# Patient Record
Sex: FEMALE | Race: WHITE | Employment: UNEMPLOYED | URBAN - METROPOLITAN AREA
[De-identification: names, ages, dates, MRNs, and addresses within clinical notes are randomized per-mention and may not be internally consistent; named-entity substitution may affect disease eponyms.]

---

## 2019-05-20 DIAGNOSIS — M25.531 RIGHT WRIST PAIN: Primary | ICD-10-CM

## 2019-05-21 ENCOUNTER — OFFICE VISIT (OUTPATIENT)
Dept: ORTHOPEDIC SURGERY | Age: 24
End: 2019-05-21
Payer: COMMERCIAL

## 2019-05-21 VITALS — HEIGHT: 61 IN | BODY MASS INDEX: 43.99 KG/M2 | WEIGHT: 233 LBS

## 2019-05-21 DIAGNOSIS — S52.571A OTHER CLOSED INTRA-ARTICULAR FRACTURE OF DISTAL END OF RIGHT RADIUS, INITIAL ENCOUNTER: Primary | ICD-10-CM

## 2019-05-21 PROCEDURE — 25605 CLTX DST RDL FX/EPHYS SEP W/: CPT | Performed by: ORTHOPAEDIC SURGERY

## 2019-05-21 PROCEDURE — 99204 OFFICE O/P NEW MOD 45 MIN: CPT | Performed by: ORTHOPAEDIC SURGERY

## 2019-05-21 RX ORDER — HYDROCODONE BITARTRATE AND ACETAMINOPHEN 5; 325 MG/1; MG/1
1 TABLET ORAL EVERY 6 HOURS PRN
COMMUNITY
End: 2019-05-24 | Stop reason: ALTCHOICE

## 2019-05-21 RX ORDER — ASCORBIC ACID 500 MG
500 TABLET ORAL DAILY
Qty: 50 TABLET | Refills: 0 | Status: ON HOLD | OUTPATIENT
Start: 2019-05-21 | End: 2021-05-26

## 2019-05-21 RX ORDER — HYDROCODONE BITARTRATE AND ACETAMINOPHEN 5; 325 MG/1; MG/1
1 TABLET ORAL EVERY 6 HOURS PRN
Qty: 28 TABLET | Refills: 0 | Status: SHIPPED | OUTPATIENT
Start: 2019-05-21 | End: 2019-05-28

## 2019-05-21 NOTE — LETTER
[address]  Phone: [phone]  Fax: [phone]    Alan Middleton DO        May 21, 2019     Patient: Pauly Mckeon   YOB: 1995   Date of Visit: 5/21/2019       To Whom It May Concern: It is my medical opinion that Pauly Mckeon should remain out of work until further notice. Patient sustained a wrist injury requiring further advanced imaging, CT. Once images are completed they will be reviewed in my office and further treatment options discussed. If you have any questions or concerns, please don't hesitate to call.     Sincerely,        Alan Middleton DO

## 2019-05-21 NOTE — PROGRESS NOTES
Chief Complaint   Patient presents with    Wrist Pain     Right wrist fracture, Patient fell out of truck. Injury on 5/17/2019         HPI:    Patient is 30 y.o. female complaining of right wrist pain. 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 She was seen at ER out of state and told to follow up with ortho. Previous treatments include splinting. ROS:    Skin: (-) rash,(-) psoriasis,(-) eczema, (-)skin cancer. Neurologic: (-)numbness, (-)tingling, (-)headaches, (-) LOC. Cardiovascular: (-) Chest pain, (-) swelling in legs/feet, (-) SOB, (-) cramping in legs/feet with walking. All other review of systems negative except stated above or in HPI      No past medical history on file. No past surgical history on file. Current Outpatient Medications:     HYDROcodone-acetaminophen (NORCO) 5-325 MG per tablet, Take 1 tablet by mouth every 6 hours as needed for Pain., Disp: , Rfl:     HYDROcodone-acetaminophen (NORCO) 5-325 MG per tablet, Take 1 tablet by mouth every 6 hours as needed for Pain for up to 7 days. , Disp: 28 tablet, Rfl: 0    vitamin C (ASCORBIC ACID) 500 MG tablet, Take 1 tablet by mouth daily, Disp: 50 tablet, Rfl: 0  No Known Allergies  Social History     Socioeconomic History    Marital status: Single     Spouse name: Not on file    Number of children: Not on file    Years of education: Not on file    Highest education level: Not on file   Occupational History    Not on file   Social Needs    Financial resource strain: Not on file    Food insecurity:     Worry: Not on file     Inability: Not on file    Transportation needs:     Medical: Not on file     Non-medical: Not on file   Tobacco Use    Smoking status: Never Smoker   Substance and Sexual Activity    Alcohol use: Not on file    Drug use: Not on file    Sexual activity: Not on file   Lifestyle    Physical activity:     Days per week: Not on file     Minutes per session: Not on file    Stress: Not on file Relationships    Social connections:     Talks on phone: Not on file     Gets together: Not on file     Attends Restorationist service: Not on file     Active member of club or organization: Not on file     Attends meetings of clubs or organizations: Not on file     Relationship status: Not on file    Intimate partner violence:     Fear of current or ex partner: Not on file     Emotionally abused: Not on file     Physically abused: Not on file     Forced sexual activity: Not on file   Other Topics Concern    Not on file   Social History Narrative    Not on file     No family history on file. Physical Exam:    Ht 5' 1\" (1.549 m)   Wt 233 lb (105.7 kg)   BMI 44.02 kg/m²     GENERAL: alert, appears stated age, cooperative, no acute distress    HEENT: Head is normocephalic, atraumatic. PERRLA. SKIN: Clean, dry, intact. There is not any cellulitis or cutaneous lesions noted in the lower extremities    PULMONARY: breathing is regular and unlabored, no acute distress    CV: The bilateral upper and lower extremities are warm and well-perfused with brisk capillary refill. 2+ pulses UE and LE bilateral.     PSYCHIATRY: Pleasant mood, appropriate behavior, follows commands    NEURO: Sensation is intact distally with light touch with no alteration. Motor exam of the lower extremities show quadriceps, hamstrings, foot dorsiflexion and plantarflexion grossly intact 5/5. LYMPH: No lymphedema present distally in upper or lower extrimity. MUSCULOSKELETAL:  Gait: Patient walks around the exam room and in the hallway with a normal gait and no limp. Balance and coordination are age appropriate. Shoulder Exam:  Full range of motion without pain. Elbow Exam:  Full painless range of motion. No effusion. No tenderness to palpation. Wrist/Hand Exam:  Moderate swelling in the Right wrist. Tenderness to palpation over the distal radius. Decreased range of motion secondary to pain.        Imaging:  Xr Wrist Right (min 3 Views)    Result Date: 5/21/2019  LOCATION: 200 EXAM: XR WRIST RIGHT (MIN 3 VIEWS) COMPARISON: None HISTORY: Extremity injury with pain. TECHNIQUE: 3 views of the right wrist were obtained. FINDINGS: Overlying splint obscures of fracture. Distal radius fracture is present with ulnar styloid avulsion. No malalignment seen. Distal radius and ulna fractures. Jeffy Gr was seen today for wrist pain. Diagnoses and all orders for this visit:    Other closed intra-articular fracture of distal end of right radius, initial encounter  -     CT WRIST RIGHT WO CONTRAST; Future  -     HYDROcodone-acetaminophen (NORCO) 5-325 MG per tablet; Take 1 tablet by mouth every 6 hours as needed for Pain for up to 7 days. -     vitamin C (ASCORBIC ACID) 500 MG tablet; Take 1 tablet by mouth daily  -     ND APPLY LONG ARM SPLINT      Patient seen and examined. X-rays reviewed. Patient sustained a nondisplaced intra-articular fracture of the right distal radius. Based off of the x-rays and patient's history, patient may benefit from surgery. However after discussing details in great detail, patient will like to avoid surgery at all possible. A CT scan is also recommended for further evaluation and management of the fracture including the need for surgery as well as preoperative planning. Once again after discussing this, patient is willing to undergo CT scan but would still like to avoid surgery  Patient to remain off work due to injury at this point. Letter distributed today    Return to clinic after CT scan. In a 15 minute assessment and discussion, patient was provided a well molded fiberglass sugar tong splint and applied with manipulation at the fracture site to maintain alignment. She was educated in detail how to maintain splint. Sherri Little DO  5/21/19    45 minutes was spent with patient. 50% or greater was spent counseling the patient.

## 2019-05-24 ENCOUNTER — OFFICE VISIT (OUTPATIENT)
Dept: ORTHOPEDIC SURGERY | Age: 24
End: 2019-05-24
Payer: COMMERCIAL

## 2019-05-24 VITALS — BODY MASS INDEX: 43.99 KG/M2 | HEIGHT: 61 IN | WEIGHT: 233 LBS

## 2019-05-24 DIAGNOSIS — S52.571A OTHER CLOSED INTRA-ARTICULAR FRACTURE OF DISTAL END OF RIGHT RADIUS, INITIAL ENCOUNTER: Primary | ICD-10-CM

## 2019-05-24 PROCEDURE — 99203 OFFICE O/P NEW LOW 30 MIN: CPT | Performed by: ORTHOPAEDIC SURGERY

## 2019-05-24 NOTE — PROGRESS NOTES
Leslie Camacho is a 30 y.o. female, who presents   Chief Complaint   Patient presents with   [facility] New Patient     new patient for right distal end of radius fracture, DOI 5/17/19, pateint fell out of her truck. She had Ct done       HPI[de-identified] Injury occurred 7 days ago in [facility]. Demar Ayon is a  and was stepping down from the truck with laundry and fell hurting her right wrist. She went to emergency room in [facility] and was x-rayed there and told she had a fracture and was splinted. She followed up. She upon return here and was referred for orthopedic care. Imaging was done showing a comminuted fracture of the distal right radius which is intra-articular and also included the ulnar styloid. She also had a CT after this to further define the fracture nature. Allergies; medications; past medical, surgical, family, and social history; and problem list have been reviewed today and updated as indicated in this encounter - see below following Ortho specifics. Musculoskeletal: Her splint is intact and finger neurovascular function is good. Radiologic Studies: Plain films and CT of the right wrist showed a comminuted intra-articular fracture of the right distal radius with slight dorsiflexion angulation but reasonable contour of the joint surface. There is only slight displacement of the ulnar styloid. ASSESSMENT:  Demar Ayon was seen today for new patient. Diagnoses and all orders for this visit:    Other closed intra-articular fracture of distal end of right radius, initial encounter  -     XR WRIST RIGHT (MIN 3 VIEWS); Future     Treatment alternatives were reviewed including medical and physical therapies, injections, and surgical options, expected risks benefits and likely outcome of each were discussed in detail, questions asked and answered and understood. Conservative and surgical treatment options were reviewed and discussed in detail with apparent good understanding.     PLAN: Demar Ayon wants to wait and follow up with repeat x-rays for comparison to see if she can reasonably avoid surgical treatment of this fracture. There is no problem list on file for this patient. History reviewed. No pertinent past medical history. History reviewed. No pertinent surgical history. Current Outpatient Medications   Medication Sig Dispense Refill    HYDROcodone-acetaminophen (NORCO) 5-325 MG per tablet Take 1 tablet by mouth every 6 hours as needed for Pain for up to 7 days. 28 tablet 0    vitamin C (ASCORBIC ACID) 500 MG tablet Take 1 tablet by mouth daily 50 tablet 0     No current facility-administered medications for this visit. No Known Allergies    Social History     Socioeconomic History    Marital status: Single     Spouse name: None    Number of children: None    Years of education: None    Highest education level: None   Occupational History    None   Social Needs    Financial resource strain: None    Food insecurity:     Worry: None     Inability: None    Transportation needs:     Medical: None     Non-medical: None   Tobacco Use    Smoking status: Current Every Day Smoker    Smokeless tobacco: Never Used   Substance and Sexual Activity    Alcohol use: Yes     Comment: occ    Drug use: None    Sexual activity: Yes     Partners: Female   Lifestyle    Physical activity:     Days per week: None     Minutes per session: None    Stress: None   Relationships    Social connections:     Talks on phone: None     Gets together: None     Attends Christian service: None     Active member of club or organization: None     Attends meetings of clubs or organizations: None     Relationship status: None    Intimate partner violence:     Fear of current or ex partner: None     Emotionally abused: None     Physically abused: None     Forced sexual activity: None   Other Topics Concern    None   Social History Narrative    None       History reviewed. No pertinent family history. Review of Systems:   As follows except as previously noted in HPI:  Constitutional: Negative for chills, diaphoresis,  fever   Respiratory: Negative for cough, shortness of breath and wheezing. Cardiovascular: Negative for chest pain and palpitations. Neurological: Negative for dizziness, syncope,   GI / : abdominal pain or cramping  Musculoskeletal: see HPI       Objective:   Physical Exam   Constitutional: Oriented to person, place, and time. and appears well-developed and well-nourished. :   Head: Normocephalic and atraumatic. Neck: Neck supple. Eyes: EOM are normal.   Pulmonary/Chest: Effort normal.  No respiratory distress, no wheezes. Neurological: Alert and oriented to person  Skin: Skin is warm and dry. Kourtney Beaulieu DO    5/24/19  12:12 PM    All reasonable efforts have been made to minimize the risk of errors that may occur in the use of voice recognition and other electronic means of charting.

## 2019-05-29 ENCOUNTER — OFFICE VISIT (OUTPATIENT)
Dept: ORTHOPEDIC SURGERY | Age: 24
End: 2019-05-29

## 2019-05-29 VITALS — HEIGHT: 61 IN | WEIGHT: 233 LBS | BODY MASS INDEX: 43.99 KG/M2

## 2019-05-29 DIAGNOSIS — S52.571A OTHER CLOSED INTRA-ARTICULAR FRACTURE OF DISTAL END OF RIGHT RADIUS, INITIAL ENCOUNTER: Primary | ICD-10-CM

## 2019-05-29 PROCEDURE — 99024 POSTOP FOLLOW-UP VISIT: CPT | Performed by: ORTHOPAEDIC SURGERY

## 2019-05-29 NOTE — PROGRESS NOTES
Chief Complaint:   Chief Complaint   Patient presents with    Follow-up     follow up from wrist fracture, patient had images today. Austine Bumpers  is 12 days post injury to her right distal radius and ulnar styloid. She has a comminuted intra-articular fracture. We'll following up to including x-rays to assess the alignment and position and determine whether she needs surgical treatment not. Allergies; medications; past medical, surgical, family, and social history; and problem list have been reviewed today and updated as indicated in this encounter seen below. Exam: The splint is intact. Fingers move well. She does not seem to be any great distress this time. Radiographs: Imaging 3 views was done and compared to previous films. There has been slight additional settling towards the radial side and the distal radius. There is also been an increase in the dorsiflexion with the dorsal impaction. The ulnar styloid is slightly displaced towards the radial side consistent with the overall injury. This was shown to the patient and her  and discussed in detail with bearing good understanding. ASSESSMENT:    Joe Garza was seen today for follow-up. Diagnoses and all orders for this visit:    Other closed intra-articular fracture of distal end of right radius, initial encounter        PLAN: Open reduction internal plate-screw fixation right distal radius. The surgery, risks, prognosis, limitations and any potential rehab issues were discussed in detail with bearing good understanding and consent. No follow-ups on file. Current Outpatient Medications   Medication Sig Dispense Refill    vitamin C (ASCORBIC ACID) 500 MG tablet Take 1 tablet by mouth daily 50 tablet 0     No current facility-administered medications for this visit. There is no problem list on file for this patient. History reviewed. No pertinent past medical history. History reviewed.  No pertinent normal. No stridor. No respiratory distress, no wheezes. Abdominal:  No abnormal distension. Neurological: Alert and oriented to person, place, and time. Skin: Skin is warm and dry. Psychiatric: Normal mood and affect.  Behavior is normal. Thought content normal.    JESSICA Wills DO    5/29/19  12:06 PM

## 2019-05-30 ENCOUNTER — ANESTHESIA EVENT (OUTPATIENT)
Dept: OPERATING ROOM | Age: 24
End: 2019-05-30
Payer: COMMERCIAL

## 2019-05-30 RX ORDER — HYDROCODONE BITARTRATE AND ACETAMINOPHEN 5; 325 MG/1; MG/1
1 TABLET ORAL EVERY 6 HOURS PRN
Status: ON HOLD | COMMUNITY
End: 2019-05-31 | Stop reason: HOSPADM

## 2019-05-30 RX ORDER — IBUPROFEN 200 MG
600 TABLET ORAL PRN
Status: ON HOLD | COMMUNITY
End: 2021-05-26

## 2019-05-31 ENCOUNTER — ANESTHESIA (OUTPATIENT)
Dept: OPERATING ROOM | Age: 24
End: 2019-05-31
Payer: COMMERCIAL

## 2019-05-31 ENCOUNTER — HOSPITAL ENCOUNTER (OUTPATIENT)
Dept: GENERAL RADIOLOGY | Age: 24
Discharge: HOME | End: 2019-06-02
Payer: COMMERCIAL

## 2019-05-31 ENCOUNTER — HOSPITAL ENCOUNTER (OUTPATIENT)
Age: 24
Setting detail: OUTPATIENT SURGERY
Discharge: HOME | End: 2019-05-31
Attending: ORTHOPAEDIC SURGERY | Admitting: ORTHOPAEDIC SURGERY
Payer: COMMERCIAL

## 2019-05-31 VITALS
DIASTOLIC BLOOD PRESSURE: 77 MMHG | SYSTOLIC BLOOD PRESSURE: 114 MMHG | OXYGEN SATURATION: 99 % | RESPIRATION RATE: 15 BRPM

## 2019-05-31 VITALS
BODY MASS INDEX: 44.93 KG/M2 | DIASTOLIC BLOOD PRESSURE: 70 MMHG | SYSTOLIC BLOOD PRESSURE: 108 MMHG | TEMPERATURE: 97.2 F | WEIGHT: 238 LBS | HEART RATE: 68 BPM | RESPIRATION RATE: 14 BRPM | OXYGEN SATURATION: 98 % | HEIGHT: 61 IN

## 2019-05-31 DIAGNOSIS — S52.611A CLOSED DISPLACED FRACTURE OF STYLOID PROCESS OF RIGHT ULNA, INITIAL ENCOUNTER: ICD-10-CM

## 2019-05-31 DIAGNOSIS — S62.101A CLOSED FRACTURE OF RIGHT WRIST, INITIAL ENCOUNTER: ICD-10-CM

## 2019-05-31 DIAGNOSIS — S52.501A CLOSED FRACTURE OF DISTAL END OF RIGHT RADIUS, UNSPECIFIED FRACTURE MORPHOLOGY, INITIAL ENCOUNTER: Primary | ICD-10-CM

## 2019-05-31 LAB — HCG(URINE) PREGNANCY TEST: NEGATIVE

## 2019-05-31 PROCEDURE — 3700000001 HC ADD 15 MINUTES (ANESTHESIA): Performed by: ORTHOPAEDIC SURGERY

## 2019-05-31 PROCEDURE — 3209999900 FLUORO FOR SURGICAL PROCEDURES

## 2019-05-31 PROCEDURE — 3700000000 HC ANESTHESIA ATTENDED CARE: Performed by: ORTHOPAEDIC SURGERY

## 2019-05-31 PROCEDURE — 6360000002 HC RX W HCPCS: Performed by: NURSE ANESTHETIST, CERTIFIED REGISTERED

## 2019-05-31 PROCEDURE — 7100000000 HC PACU RECOVERY - FIRST 15 MIN: Performed by: ORTHOPAEDIC SURGERY

## 2019-05-31 PROCEDURE — 7100000001 HC PACU RECOVERY - ADDTL 15 MIN: Performed by: ORTHOPAEDIC SURGERY

## 2019-05-31 PROCEDURE — C1713 ANCHOR/SCREW BN/BN,TIS/BN: HCPCS | Performed by: ORTHOPAEDIC SURGERY

## 2019-05-31 PROCEDURE — 2709999900 HC NON-CHARGEABLE SUPPLY: Performed by: ORTHOPAEDIC SURGERY

## 2019-05-31 PROCEDURE — 2580000003 HC RX 258: Performed by: ANESTHESIOLOGY

## 2019-05-31 PROCEDURE — 7100000011 HC PHASE II RECOVERY - ADDTL 15 MIN: Performed by: ORTHOPAEDIC SURGERY

## 2019-05-31 PROCEDURE — 6360000002 HC RX W HCPCS: Performed by: ANESTHESIOLOGY

## 2019-05-31 PROCEDURE — 3600000003 HC SURGERY LEVEL 3 BASE: Performed by: ORTHOPAEDIC SURGERY

## 2019-05-31 PROCEDURE — 81025 URINE PREGNANCY TEST: CPT

## 2019-05-31 PROCEDURE — 2720000010 HC SURG SUPPLY STERILE: Performed by: ORTHOPAEDIC SURGERY

## 2019-05-31 PROCEDURE — 6360000002 HC RX W HCPCS

## 2019-05-31 PROCEDURE — 64415 NJX AA&/STRD BRCH PLXS IMG: CPT | Performed by: ANESTHESIOLOGY

## 2019-05-31 PROCEDURE — 2580000003 HC RX 258: Performed by: NURSE ANESTHETIST, CERTIFIED REGISTERED

## 2019-05-31 PROCEDURE — 3600000013 HC SURGERY LEVEL 3 ADDTL 15MIN: Performed by: ORTHOPAEDIC SURGERY

## 2019-05-31 PROCEDURE — 7100000010 HC PHASE II RECOVERY - FIRST 15 MIN: Performed by: ORTHOPAEDIC SURGERY

## 2019-05-31 DEVICE — SCREW BNE L18MM DIA2.4MM DST RAD VOLAR S STL ST VAR ANG LOK: Type: IMPLANTABLE DEVICE | Site: WRIST | Status: FUNCTIONAL

## 2019-05-31 DEVICE — SCREW BNE L14MM DIA2.7MM CORT S STL ST T8 STARDRV RECESS: Type: IMPLANTABLE DEVICE | Site: WRIST | Status: FUNCTIONAL

## 2019-05-31 DEVICE — SCREW BNE L12MM DIA2.7MM CORT S STL ST T8 STARDRV RECESS: Type: IMPLANTABLE DEVICE | Site: WRIST | Status: FUNCTIONAL

## 2019-05-31 DEVICE — PLATE BNE W22XL54MM STD 6X3 H ST R DST RAD VOLAR S STL VAR: Type: IMPLANTABLE DEVICE | Site: WRIST | Status: FUNCTIONAL

## 2019-05-31 DEVICE — SCREW BNE L16MM DIA2.4MM DST RAD VOLAR S STL ST VAR ANG LOK: Type: IMPLANTABLE DEVICE | Site: WRIST | Status: FUNCTIONAL

## 2019-05-31 RX ORDER — MIDAZOLAM HYDROCHLORIDE 1 MG/ML
INJECTION INTRAMUSCULAR; INTRAVENOUS
Status: DISCONTINUED
Start: 2019-05-31 | End: 2019-05-31 | Stop reason: HOSPADM

## 2019-05-31 RX ORDER — ROPIVACAINE HYDROCHLORIDE 5 MG/ML
INJECTION, SOLUTION EPIDURAL; INFILTRATION; PERINEURAL
Status: COMPLETED | OUTPATIENT
Start: 2019-05-31 | End: 2019-05-31

## 2019-05-31 RX ORDER — SODIUM CHLORIDE 0.9 % (FLUSH) 0.9 %
10 SYRINGE (ML) INJECTION PRN
Status: DISCONTINUED | OUTPATIENT
Start: 2019-05-31 | End: 2019-05-31 | Stop reason: HOSPADM

## 2019-05-31 RX ORDER — FENTANYL CITRATE 50 UG/ML
INJECTION, SOLUTION INTRAMUSCULAR; INTRAVENOUS
Status: DISCONTINUED
Start: 2019-05-31 | End: 2019-05-31 | Stop reason: HOSPADM

## 2019-05-31 RX ORDER — MEPERIDINE HYDROCHLORIDE 25 MG/ML
INJECTION INTRAMUSCULAR; INTRAVENOUS; SUBCUTANEOUS
Status: COMPLETED
Start: 2019-05-31 | End: 2019-05-31

## 2019-05-31 RX ORDER — FENTANYL CITRATE 50 UG/ML
50 INJECTION, SOLUTION INTRAMUSCULAR; INTRAVENOUS
Status: COMPLETED | OUTPATIENT
Start: 2019-05-31 | End: 2019-05-31

## 2019-05-31 RX ORDER — PROPOFOL 10 MG/ML
INJECTION, EMULSION INTRAVENOUS PRN
Status: DISCONTINUED | OUTPATIENT
Start: 2019-05-31 | End: 2019-05-31 | Stop reason: SDUPTHER

## 2019-05-31 RX ORDER — SODIUM CHLORIDE, SODIUM LACTATE, POTASSIUM CHLORIDE, CALCIUM CHLORIDE 600; 310; 30; 20 MG/100ML; MG/100ML; MG/100ML; MG/100ML
INJECTION, SOLUTION INTRAVENOUS CONTINUOUS PRN
Status: DISCONTINUED | OUTPATIENT
Start: 2019-05-31 | End: 2019-05-31 | Stop reason: SDUPTHER

## 2019-05-31 RX ORDER — ONDANSETRON 2 MG/ML
INJECTION INTRAMUSCULAR; INTRAVENOUS PRN
Status: DISCONTINUED | OUTPATIENT
Start: 2019-05-31 | End: 2019-05-31 | Stop reason: SDUPTHER

## 2019-05-31 RX ORDER — DEXAMETHASONE SODIUM PHOSPHATE 10 MG/ML
INJECTION, SOLUTION INTRAMUSCULAR; INTRAVENOUS PRN
Status: DISCONTINUED | OUTPATIENT
Start: 2019-05-31 | End: 2019-05-31 | Stop reason: SDUPTHER

## 2019-05-31 RX ORDER — ROPIVACAINE HYDROCHLORIDE 5 MG/ML
INJECTION, SOLUTION EPIDURAL; INFILTRATION; PERINEURAL
Status: COMPLETED
Start: 2019-05-31 | End: 2019-05-31

## 2019-05-31 RX ORDER — FENTANYL CITRATE 50 UG/ML
INJECTION, SOLUTION INTRAMUSCULAR; INTRAVENOUS PRN
Status: DISCONTINUED | OUTPATIENT
Start: 2019-05-31 | End: 2019-05-31 | Stop reason: SDUPTHER

## 2019-05-31 RX ORDER — MIDAZOLAM HYDROCHLORIDE 1 MG/ML
1 INJECTION INTRAMUSCULAR; INTRAVENOUS PRN
Status: CANCELLED | OUTPATIENT
Start: 2019-05-31

## 2019-05-31 RX ORDER — SODIUM CHLORIDE, SODIUM LACTATE, POTASSIUM CHLORIDE, CALCIUM CHLORIDE 600; 310; 30; 20 MG/100ML; MG/100ML; MG/100ML; MG/100ML
INJECTION, SOLUTION INTRAVENOUS CONTINUOUS
Status: DISCONTINUED | OUTPATIENT
Start: 2019-05-31 | End: 2019-05-31 | Stop reason: HOSPADM

## 2019-05-31 RX ORDER — ROPIVACAINE HYDROCHLORIDE 5 MG/ML
40 INJECTION, SOLUTION EPIDURAL; INFILTRATION; PERINEURAL
Status: CANCELLED | OUTPATIENT
Start: 2019-05-31 | End: 2019-05-31

## 2019-05-31 RX ORDER — PROPOFOL 10 MG/ML
INJECTION, EMULSION INTRAVENOUS CONTINUOUS PRN
Status: DISCONTINUED | OUTPATIENT
Start: 2019-05-31 | End: 2019-05-31 | Stop reason: SDUPTHER

## 2019-05-31 RX ORDER — ROPIVACAINE HYDROCHLORIDE 5 MG/ML
30 INJECTION, SOLUTION EPIDURAL; INFILTRATION; PERINEURAL ONCE
Status: COMPLETED | OUTPATIENT
Start: 2019-05-31 | End: 2019-05-31

## 2019-05-31 RX ORDER — MEPERIDINE HYDROCHLORIDE 25 MG/ML
12.5 INJECTION INTRAMUSCULAR; INTRAVENOUS; SUBCUTANEOUS EVERY 5 MIN PRN
Status: DISCONTINUED | OUTPATIENT
Start: 2019-05-31 | End: 2019-05-31 | Stop reason: HOSPADM

## 2019-05-31 RX ORDER — MIDAZOLAM HYDROCHLORIDE 1 MG/ML
2 INJECTION INTRAMUSCULAR; INTRAVENOUS
Status: COMPLETED | OUTPATIENT
Start: 2019-05-31 | End: 2019-05-31

## 2019-05-31 RX ORDER — HYDROMORPHONE HYDROCHLORIDE 1 MG/ML
0.25 INJECTION, SOLUTION INTRAMUSCULAR; INTRAVENOUS; SUBCUTANEOUS EVERY 5 MIN PRN
Status: DISCONTINUED | OUTPATIENT
Start: 2019-05-31 | End: 2019-05-31 | Stop reason: HOSPADM

## 2019-05-31 RX ORDER — NALOXONE HYDROCHLORIDE 0.4 MG/ML
0.4 INJECTION, SOLUTION INTRAMUSCULAR; INTRAVENOUS; SUBCUTANEOUS PRN
Status: CANCELLED | OUTPATIENT
Start: 2019-05-31

## 2019-05-31 RX ORDER — HYDROCODONE BITARTRATE AND ACETAMINOPHEN 5; 325 MG/1; MG/1
1 TABLET ORAL EVERY 6 HOURS PRN
Qty: 30 TABLET | Refills: 0 | Status: SHIPPED | OUTPATIENT
Start: 2019-05-31 | End: 2019-06-08

## 2019-05-31 RX ORDER — FENTANYL CITRATE 50 UG/ML
50 INJECTION, SOLUTION INTRAMUSCULAR; INTRAVENOUS EVERY 10 MIN PRN
Status: CANCELLED | OUTPATIENT
Start: 2019-05-31

## 2019-05-31 RX ADMIN — PROPOFOL 50 MG: 10 INJECTION, EMULSION INTRAVENOUS at 15:27

## 2019-05-31 RX ADMIN — ONDANSETRON HYDROCHLORIDE 4 MG: 2 INJECTION, SOLUTION INTRAMUSCULAR; INTRAVENOUS at 14:55

## 2019-05-31 RX ADMIN — ROPIVACAINE HYDROCHLORIDE 40 ML: 5 INJECTION, SOLUTION EPIDURAL; INFILTRATION; PERINEURAL at 13:54

## 2019-05-31 RX ADMIN — PROPOFOL 125 MCG/KG/MIN: 10 INJECTION, EMULSION INTRAVENOUS at 14:52

## 2019-05-31 RX ADMIN — PROPOFOL 50 MG: 10 INJECTION, EMULSION INTRAVENOUS at 14:55

## 2019-05-31 RX ADMIN — FENTANYL CITRATE 50 MCG: 50 INJECTION INTRAMUSCULAR; INTRAVENOUS at 13:45

## 2019-05-31 RX ADMIN — PHENYLEPHRINE HYDROCHLORIDE 100 MCG: 10 INJECTION INTRAVENOUS at 15:32

## 2019-05-31 RX ADMIN — DEXAMETHASONE SODIUM PHOSPHATE 10 MG: 10 INJECTION, SOLUTION INTRAMUSCULAR; INTRAVENOUS at 14:55

## 2019-05-31 RX ADMIN — MIDAZOLAM 2 MG: 1 INJECTION INTRAMUSCULAR; INTRAVENOUS at 13:45

## 2019-05-31 RX ADMIN — FENTANYL CITRATE 50 MCG: 50 INJECTION INTRAMUSCULAR; INTRAVENOUS at 13:42

## 2019-05-31 RX ADMIN — SODIUM CHLORIDE, POTASSIUM CHLORIDE, SODIUM LACTATE AND CALCIUM CHLORIDE: 600; 310; 30; 20 INJECTION, SOLUTION INTRAVENOUS at 14:45

## 2019-05-31 RX ADMIN — MIDAZOLAM 2 MG: 1 INJECTION INTRAMUSCULAR; INTRAVENOUS at 13:42

## 2019-05-31 RX ADMIN — MEPERIDINE HYDROCHLORIDE 12.5 MG: 25 INJECTION INTRAMUSCULAR; INTRAVENOUS; SUBCUTANEOUS at 17:30

## 2019-05-31 RX ADMIN — HYDROMORPHONE HYDROCHLORIDE 0.25 MG: 1 INJECTION, SOLUTION INTRAMUSCULAR; INTRAVENOUS; SUBCUTANEOUS at 17:21

## 2019-05-31 RX ADMIN — MEPERIDINE HYDROCHLORIDE 12.5 MG: 25 INJECTION INTRAMUSCULAR; INTRAVENOUS; SUBCUTANEOUS at 17:42

## 2019-05-31 RX ADMIN — SODIUM CHLORIDE, POTASSIUM CHLORIDE, SODIUM LACTATE AND CALCIUM CHLORIDE: 600; 310; 30; 20 INJECTION, SOLUTION INTRAVENOUS at 12:40

## 2019-05-31 RX ADMIN — FENTANYL CITRATE 100 MCG: 50 INJECTION, SOLUTION INTRAMUSCULAR; INTRAVENOUS at 15:28

## 2019-05-31 RX ADMIN — PROPOFOL 100 MG: 10 INJECTION, EMULSION INTRAVENOUS at 14:52

## 2019-05-31 RX ADMIN — ROPIVACAINE HYDROCHLORIDE 38 ML: 5 INJECTION, SOLUTION EPIDURAL; INFILTRATION; PERINEURAL at 13:45

## 2019-05-31 ASSESSMENT — PULMONARY FUNCTION TESTS
PIF_VALUE: 0
PIF_VALUE: 1
PIF_VALUE: 0
PIF_VALUE: 1
PIF_VALUE: 0
PIF_VALUE: 1
PIF_VALUE: 0
PIF_VALUE: 1
PIF_VALUE: 0
PIF_VALUE: 1

## 2019-05-31 ASSESSMENT — PAIN DESCRIPTION - LOCATION
LOCATION: WRIST

## 2019-05-31 ASSESSMENT — PAIN DESCRIPTION - ORIENTATION
ORIENTATION: RIGHT

## 2019-05-31 ASSESSMENT — PAIN SCALES - GENERAL
PAINLEVEL_OUTOF10: 5
PAINLEVEL_OUTOF10: 5
PAINLEVEL_OUTOF10: 2
PAINLEVEL_OUTOF10: 2
PAINLEVEL_OUTOF10: 5
PAINLEVEL_OUTOF10: 4
PAINLEVEL_OUTOF10: 5
PAINLEVEL_OUTOF10: 3

## 2019-05-31 ASSESSMENT — PAIN DESCRIPTION - PROGRESSION
CLINICAL_PROGRESSION: GRADUALLY WORSENING
CLINICAL_PROGRESSION: GRADUALLY WORSENING
CLINICAL_PROGRESSION: GRADUALLY IMPROVING
CLINICAL_PROGRESSION: GRADUALLY IMPROVING
CLINICAL_PROGRESSION: NOT CHANGED
CLINICAL_PROGRESSION: GRADUALLY WORSENING
CLINICAL_PROGRESSION: GRADUALLY IMPROVING

## 2019-05-31 ASSESSMENT — PAIN DESCRIPTION - DESCRIPTORS
DESCRIPTORS: BURNING
DESCRIPTORS: BURNING
DESCRIPTORS: BURNING;JABBING
DESCRIPTORS: BURNING
DESCRIPTORS: BURNING;JABBING

## 2019-05-31 ASSESSMENT — PAIN DESCRIPTION - ONSET
ONSET: GRADUAL

## 2019-05-31 ASSESSMENT — PAIN DESCRIPTION - FREQUENCY
FREQUENCY: CONTINUOUS
FREQUENCY: INTERMITTENT

## 2019-05-31 ASSESSMENT — PAIN - FUNCTIONAL ASSESSMENT
PAIN_FUNCTIONAL_ASSESSMENT: 0-10
PAIN_FUNCTIONAL_ASSESSMENT: PREVENTS OR INTERFERES SOME ACTIVE ACTIVITIES AND ADLS

## 2019-05-31 ASSESSMENT — PAIN DESCRIPTION - PAIN TYPE
TYPE: SURGICAL PAIN

## 2019-05-31 ASSESSMENT — LIFESTYLE VARIABLES: SMOKING_STATUS: 1

## 2019-05-31 NOTE — PROGRESS NOTES
@ 1344 procedure began, additional versed and fentanyl was needed. Tyrone tolerated the procedure well without any complications     @ 1863 family brought to bedside to visit after Clavicular Block was Completed.
any makeup (including no eye makeup) or nail polish on your fingers or toes. 11. DO NOT wear any jewelry or piercings on day of surgery. All body piercing jewelry must be removed. 12. Shower the night before surgery with _x_Antibacterial soap /LOTTIE WIPES________    13. TOTAL JOINT REPLACEMENT/HYSTERECTOMY PATIENTS ONLY---Remember to bring Blood Bank bracelet to the hospital on the day of surgery. 14. If you have a Living Will and Durable Power of  for Healthcare, please bring in a copy. 15. If appropriate bring crutches, inspirex, WALKER, CANE etc... 12. Notify your Surgeon if you develop any illness between now and surgery time, cough, cold, fever, sore throat, nausea, vomiting, etc.  Please notify your surgeon if you experience dizziness, shortness of breath or blurred vision between now & the time of your surgery. 17. If you have ___dentures, they will be removed before going to the OR; we will provide you a container. If you wear ___contact lenses or ___glasses, they will be removed; please bring a case for them. 18. To provide excellent care visitors will be limited to 2 in the room at any given time. 19. Please bring picture ID and insurance card. 20. Sleep apnea patients need to bring CPAP AND SETTINGS to hospital on day of surgery. 21. During flu season no children under the age of 15 are permitted in the hospital for the safety of all patients. 22. Other                  Please call AMBULATORY CARE if you have any further questions.    [address] [facility]     [address]

## 2019-05-31 NOTE — ANESTHESIA PRE PROCEDURE
kg)   Height: 5' 1\" (1.549 m)                                              BP Readings from Last 3 Encounters:   No data found for BP       NPO Status:                                                                                 BMI:   Wt Readings from Last 3 Encounters:   05/29/19 233 lb (105.7 kg)   05/24/19 233 lb (105.7 kg)   05/21/19 233 lb (105.7 kg)     Body mass index is 44.02 kg/m². CBC: No results found for: WBC, RBC, HGB, HCT, MCV, RDW, PLT    CMP: No results found for: NA, K, CL, CO2, BUN, CREATININE, GFRAA, AGRATIO, LABGLOM, GLUCOSE, PROT, CALCIUM, BILITOT, ALKPHOS, AST, ALT    POC Tests: No results for input(s): POCGLU, POCNA, POCK, POCCL, POCBUN, POCHEMO, POCHCT in the last 72 hours. Coags: No results found for: PROTIME, INR, APTT    HCG (If Applicable): No results found for: PREGTESTUR, PREGSERUM, HCG, HCGQUANT     ABGs: No results found for: PHART, PO2ART, XUJ4FIU, TCN5ZSU, BEART, C7BSZGLQ     Type & Screen (If Applicable):  No results found for: LABABO, [address]    Anesthesia Evaluation  Patient summary reviewed  Airway:         Dental:          Pulmonary:   (+) current smoker                           Cardiovascular:Negative CV ROS                      Neuro/Psych:   Negative Neuro/Psych ROS              GI/Hepatic/Renal: Neg GI/Hepatic/Renal ROS            Endo/Other: Negative Endo/Other ROS                    Abdominal:           Vascular: negative vascular ROS. Anesthesia Plan      general     ASA 2     (Single shot pre operative right infra clavicular brachial plexus nerve block.)  Induction: intravenous. MIPS: Postoperative opioids intended. Anesthetic plan and risks discussed with patient. Plan discussed with CRNA.     Attending anesthesiologist reviewed and agrees with Tomasa Ojeda MD   5/31/2019

## 2019-05-31 NOTE — OP NOTE
Operative report        DATE OF PROCEDURE: 2019     SURGEON: Huy Medina    ASSISTANT: Mine García    PREOPERATIVE DIAGNOSIS: comminuted intra-articular fracture of the right distal radius fracture the ulnar styloid    POSTOPERATIVE DIAGNOSIS: same    OPERATION: open reduction internal plate-screw fixation right distal radius    ANESTHESIA: iinterscalene block with IV sedation    ESTIMATED BLOOD LOSS:  Less than 50 mL    COMPLICATIONS: None    SPECIMENS: Was sent to pathology    HISTORY: The patient is a 30y.o. year old female with history of above preop diagnosis. I explained the risk, benefits, expected outcome, and alternatives to the procedure. Patient understands and is in agreement. PROCEDURE:  The patient brought the operating room after site and side were identified. Adequate regional block was compromised by anesthesia department. The patient was placed supine on the table. Tourniquet was placed high on the right upper arm. The arm was then prepped and draped sterile fashion. The C-arm was brought into position to help determine the nature and deformity of the fracture line. This was also used to plan the surgical incision. tthe right upper extremity was exsanguinated with elastic wrap tourniquet pressure was set at 250 mmHg pressure. Incision was made along the flexor carpi radialis tendon. This was deepened down through subcu tissue and the tendon sheath was opened up. The tendon was taken to the ulnar side and soft tissues were divided along the more radial side protect the neural and vascular structures as well as tendons. The median nerve is retracted the ulnar side protected. The quadratus muscle was elevated from the distal radius. This made fracture lines evident particularly the longitudinal fracture adjacent to the radial styloid.  This was reduced with a clamp and then a K wire was placed obliquely from the styloid proximal and ulnar to cross the fracture line help hold this in irrigated and closed with intradermal 3-0 Vicryl sutures  And Steri-Strips further secured the skin. Xeroform gauze and a bulky dressing was applied and the wrist was splinted in neutral position in a short arm fashion. Tourniquet was deflated and good circumflex ablation turned to the upper extremity patient was taken to recovery in stable condition. GROSS PATHOLOGY: comminuted intra-articular fracture of the right distal radius with dorsal angulation and slight shortening. Fracture of the right ulnar styloid with some radial displacement. COMPONENTS USED :  Since these volar distal radius plate standard 3 hole with multiple distal locking screws and 3 proximal cortical screws. All reasonable efforts have been made to minimize the risk of errors that may occur in the use of voice recognition and other electronic means of charting.       Electronically signed by Kourtney Beaulieu DO on 5/31/19 at 5:35 PM

## 2019-05-31 NOTE — H&P
History and Physical      CHIEF COMPLAINT:  R wrist inj    HISTORY OF PRESENT ILLNESS:          Past Medical History:    History reviewed. No pertinent past medical history. Past Surgical History:    History reviewed. No pertinent surgical history. Social History:    TOBACCO:   reports that she has been smoking cigars. She has never used smokeless tobacco.  ETOH:   reports that she drinks alcohol. DRUGS:   reports that she does not use drugs. Family History:   History reviewed. No pertinent family history. Medications Prior to Admission:  Medications Prior to Admission: HYDROcodone-acetaminophen (NORCO) 5-325 MG per tablet, Take 1 tablet by mouth every 6 hours as needed for Pain. ibuprofen (ADVIL;MOTRIN) 200 MG tablet, Take 600 mg by mouth as needed for Pain  vitamin C (ASCORBIC ACID) 500 MG tablet, Take 1 tablet by mouth daily  Allergies:  Patient has no known allergies. CONSTITUTIONAL:  negative for  chills and anorexia  HEENT:  negative for  tinnitus  RESPIRATORY:  negative for  dyspnea and cyanosis  CARDIOVASCULAR:  negative for  palpitations, PND  GASTROINTESTINAL:  negative for vomiting and hematemesis  GENITOURINARY:  negative for hematuria  ENDOCRINE:  negative for tremor  MUSCULOSKELETAL:  positive for  pain and decreased range of motion  NEUROLOGICAL:  negative for seizures and syncope  BEHAVIOR/PSYCH:  negative for agitated and anxiety    PHYSICAL EXAM:  BP (!) 129/90   Pulse 74   Temp 98 °F (36.7 °C) (Temporal)   Resp 16   Ht 5' 1\" (1.549 m)   Wt 238 lb (108 kg)   LMP 05/27/2019   SpO2 97%   BMI 44.97 kg/m²   General appearance:  awake, alert, cooperative, no apparent distress, and appears stated age  Neurologic:  Awake, alert, oriented to name, place and time. Cranial nerves II-XII are grossly intact. Motor is 5 out of 5 bilaterally. Cerebellar finger to nose, heel to shin intact. Sensory is intact.   Babinski down going, Romberg negative, and gait is normal.  Lungs:  No increased work of breathing, good air exchange, clear to auscultation bilaterally, no crackles or wheezing  Heart:  Normal apical impulse, regular rate and rhythm, normal S1 and S2, no S3 or S4, and no murmur noted  Abdomen:  No scars, normal bowel sounds, soft, non-distended, non-tender, no masses palpated, no hepatosplenomegally  Skin: warm and dry, no rash or erythema  ENT: tympanic membrane, external ear and ear canal normal bilaterally, oropharynx clear and moist with normal mucous membranes  Musculoskeletal: plint RUE    General Labs:  CBC: No results found for: WBC, RBC, HGB, HCT, MCV, RDW, PLT  CMP:  No results found for: NA, K, CL, CO2, BUN, PROT, ALB  U/A:  No components found for: Marylene Shorten, USPGRAV, UPH, Lalla Sly, UKETONE, UBILI, UBLOOD, UNITRITE, UUROBIL, [place], USQEPI, [place], UWBC, [place]    Radiology: frac R rad    ASSESSMENT AND PLAN:    ORIF      Electronically signed by Annette Vargas DO on 5/31/2019 at 1:15 PM

## 2019-06-01 NOTE — ANESTHESIA POSTPROCEDURE EVALUATION
Department of Anesthesiology  Postprocedure Note    Patient: Kell Hylton  MRN: [id number]  YOB: 1995  Date of evaluation: 5/31/2019  Time:  8:40 PM     Procedure Summary     Date:  05/31/19 Room / Location:  [address] [place] [address] 06 / [address] [facility] [address]    Anesthesia Start:  1445 Anesthesia Stop:  8972    Procedure:  RIGHT WRIST OPEN REDUCTION INTERNAL FIXATION (Right ) Diagnosis:  (INTRA ARTICULAR FRACTURE OF RIGHT DISTAL RADIUS)    Surgeon:  Alison Hawkins DO Responsible Provider:  Marylou Stovall MD    Anesthesia Type:  general ASA Status:  2          Anesthesia Type: general    Leighton Phase I: Leighton Score: 10    Leighton Phase II: Leighton Score: 10    Last vitals: Reviewed and per EMR flowsheets.        Anesthesia Post Evaluation    Patient location during evaluation: PACU  Patient participation: complete - patient participated  Level of consciousness: awake  Pain score: 0  Airway patency: patent  Nausea & Vomiting: no nausea  Complications: no  Cardiovascular status: blood pressure returned to baseline  Respiratory status: acceptable  Hydration status: euvolemic

## 2019-06-10 ENCOUNTER — OFFICE VISIT (OUTPATIENT)
Dept: ORTHOPEDIC SURGERY | Age: 24
End: 2019-06-10

## 2019-06-10 VITALS — WEIGHT: 233 LBS | HEIGHT: 61 IN | BODY MASS INDEX: 43.99 KG/M2

## 2019-06-10 DIAGNOSIS — S52.571A OTHER CLOSED INTRA-ARTICULAR FRACTURE OF DISTAL END OF RIGHT RADIUS, INITIAL ENCOUNTER: Primary | ICD-10-CM

## 2019-06-10 PROCEDURE — 99024 POSTOP FOLLOW-UP VISIT: CPT | Performed by: ORTHOPAEDIC SURGERY

## 2019-06-10 NOTE — PROGRESS NOTES
Chief Complaint:   Chief Complaint   Patient presents with    Post-Op Check     post-op from right wrist ORIF DOS 5/31/19, patient states she is doing better       [employer]  is 10 days post ORIF right distal radius. She's doing fairly well but has some soreness and guards her hand wrist and forearm quite a bit. There is no complaint of numbness or tingling. She's little itchy as expected. Allergies; medications; past medical, surgical, family, and social history; and problem list have been reviewed today and updated as indicated in this encounter seen below. Exam: Wound edges are well approximated. There is been a little bit of bleeding was from the surgery but all looks well now. She has no signs of any complication. There is no present drainage wound being dry. There is mild edema only. Fingers are little bit guarded and she doesn't move them whole lot she doesn't like forearm rotation at all. Her elbow moves fine. ASSESSMENT:    Swathi Shearer was seen today for post-op check. Diagnoses and all orders for this visit:    Other closed intra-articular fracture of distal end of right radius, initial encounter        PLAN: Swathi Shearer was placed in a forearm fracture brace which she may remove to care for the wound including the daily or QO day dressing changes. She can also begin to set this in 1 or 2 days. Finger range of motion was encouraged as well as very gentle of wrist and forearm range of motion. We'll follow in 3 weeks. Return in about 3 weeks (around 7/1/2019). Current Outpatient Medications   Medication Sig Dispense Refill    ibuprofen (ADVIL;MOTRIN) 200 MG tablet Take 600 mg by mouth as needed for Pain      vitamin C (ASCORBIC ACID) 500 MG tablet Take 1 tablet by mouth daily 50 tablet 0     No current facility-administered medications for this visit.         Patient Active Problem List   Diagnosis    Closed fracture of right distal radius    Closed displaced fracture of styloid process of right ulna       History reviewed. No pertinent past medical history. Past Surgical History:   Procedure Laterality Date    WRIST FRACTURE SURGERY Right 5/31/2019    RIGHT WRIST OPEN REDUCTION INTERNAL FIXATION performed by Sweta Gomez DO at [address]       No Known Allergies    Social History     Socioeconomic History    Marital status: Single     Spouse name: None    Number of children: None    Years of education: None    Highest education level: None   Occupational History    None   Social Needs    Financial resource strain: None    Food insecurity:     Worry: None     Inability: None    Transportation needs:     Medical: None     Non-medical: None   Tobacco Use    Smoking status: Current Every Day Smoker     Types: Cigars    Smokeless tobacco: Never Used    Tobacco comment: a cigar daily   Substance and Sexual Activity    Alcohol use: Yes     Comment: occ    Drug use: Never    Sexual activity: Yes     Partners: Female   Lifestyle    Physical activity:     Days per week: None     Minutes per session: None    Stress: None   Relationships    Social connections:     Talks on phone: None     Gets together: None     Attends [employer]ism service: None     Active member of club or organization: None     Attends meetings of clubs or organizations: None     Relationship status: None    Intimate partner violence:     Fear of current or ex partner: None     Emotionally abused: None     Physically abused: None     Forced sexual activity: None   Other Topics Concern    None   Social History Narrative    None       Review of Systems  As follows except as previously noted in HPI:  Constitutional: Negative for chills, diaphoresis, fatigue, fever and unexpected weight change. Respiratory: Negative for cough, shortness of breath and wheezing. Cardiovascular: Negative for chest pain and palpitations. Neurological: Negative for dizziness, syncope, cephalgia.   GI / :

## 2019-07-01 ENCOUNTER — OFFICE VISIT (OUTPATIENT)
Dept: ORTHOPEDIC SURGERY | Age: 24
End: 2019-07-01
Payer: COMMERCIAL

## 2019-07-01 VITALS — HEIGHT: 61 IN | WEIGHT: 235 LBS | BODY MASS INDEX: 44.37 KG/M2

## 2019-07-01 DIAGNOSIS — S52.571A OTHER CLOSED INTRA-ARTICULAR FRACTURE OF DISTAL END OF RIGHT RADIUS, INITIAL ENCOUNTER: Primary | ICD-10-CM

## 2019-07-01 PROCEDURE — 99212 OFFICE O/P EST SF 10 MIN: CPT | Performed by: ORTHOPAEDIC SURGERY

## 2019-07-26 DIAGNOSIS — S52.571A OTHER CLOSED INTRA-ARTICULAR FRACTURE OF DISTAL END OF RIGHT RADIUS, INITIAL ENCOUNTER: Primary | ICD-10-CM

## 2019-07-29 ENCOUNTER — OFFICE VISIT (OUTPATIENT)
Dept: ORTHOPEDIC SURGERY | Age: 24
End: 2019-07-29

## 2019-07-29 VITALS — BODY MASS INDEX: 44.37 KG/M2 | WEIGHT: 235.01 LBS | HEIGHT: 61 IN

## 2019-07-29 DIAGNOSIS — S52.571A OTHER CLOSED INTRA-ARTICULAR FRACTURE OF DISTAL END OF RIGHT RADIUS, INITIAL ENCOUNTER: ICD-10-CM

## 2019-07-29 DIAGNOSIS — M25.531 RIGHT WRIST PAIN: Primary | ICD-10-CM

## 2019-07-29 PROCEDURE — 99024 POSTOP FOLLOW-UP VISIT: CPT | Performed by: ORTHOPAEDIC SURGERY

## 2021-05-26 ENCOUNTER — ANESTHESIA EVENT (OUTPATIENT)
Dept: LABOR AND DELIVERY | Age: 26
End: 2021-05-26

## 2021-05-26 ENCOUNTER — HOSPITAL ENCOUNTER (INPATIENT)
Age: 26
LOS: 2 days | Discharge: HOME | DRG: 560 | End: 2021-05-28
Attending: OBSTETRICS & GYNECOLOGY | Admitting: OBSTETRICS & GYNECOLOGY
Payer: COMMERCIAL

## 2021-05-26 ENCOUNTER — ANESTHESIA (OUTPATIENT)
Dept: LABOR AND DELIVERY | Age: 26
End: 2021-05-26

## 2021-05-26 PROBLEM — S52.611A CLOSED DISPLACED FRACTURE OF STYLOID PROCESS OF RIGHT ULNA: Status: RESOLVED | Noted: 2019-05-31 | Resolved: 2021-05-26

## 2021-05-26 PROBLEM — O24.419 GESTATIONAL DIABETES MELLITUS (GDM): Status: ACTIVE | Noted: 2021-05-26

## 2021-05-26 PROBLEM — O99.820 GROUP B STREPTOCOCCAL CARRIAGE COMPLICATING PREGNANCY: Status: ACTIVE | Noted: 2021-05-26

## 2021-05-26 PROBLEM — S52.501A CLOSED FRACTURE OF RIGHT DISTAL RADIUS: Status: RESOLVED | Noted: 2019-05-31 | Resolved: 2021-05-26

## 2021-05-26 PROBLEM — Z3A.39 39 WEEKS GESTATION OF PREGNANCY: Status: ACTIVE | Noted: 2021-05-26

## 2021-05-26 PROBLEM — E66.01 MORBID OBESITY (HCC): Status: ACTIVE | Noted: 2021-05-26

## 2021-05-26 PROBLEM — Z37.9 NORMAL LABOR: Status: ACTIVE | Noted: 2021-05-26

## 2021-05-26 LAB
ABO/RH: NORMAL
ALBUMIN SERPL-MCNC: 3.6 G/DL (ref 3.5–5.2)
ALP BLD-CCNC: 157 U/L (ref 35–104)
ALT SERPL-CCNC: 15 U/L (ref 0–32)
AMPHETAMINE SCREEN, URINE: NOT DETECTED
ANION GAP SERPL CALCULATED.3IONS-SCNC: 16 MMOL/L (ref 7–16)
ANTIBODY SCREEN: NORMAL
AST SERPL-CCNC: 27 U/L (ref 0–31)
BARBITURATE SCREEN URINE: NOT DETECTED
BASOPHILS ABSOLUTE: 0.03 E9/L (ref 0–0.2)
BASOPHILS RELATIVE PERCENT: 0.3 % (ref 0–2)
BENZODIAZEPINE SCREEN, URINE: NOT DETECTED
BILIRUB SERPL-MCNC: 0.5 MG/DL (ref 0–1.2)
BUN BLDV-MCNC: 5 MG/DL (ref 6–20)
BUPRENORPHINE URINE: NOT DETECTED
CALCIUM SERPL-MCNC: 8.7 MG/DL (ref 8.6–10.2)
CANNABINOID SCREEN URINE: NOT DETECTED
CHLORIDE BLD-SCNC: 103 MMOL/L (ref 98–107)
CO2: 16 MMOL/L (ref 22–29)
COCAINE METABOLITE SCREEN URINE: NOT DETECTED
CREAT SERPL-MCNC: 0.7 MG/DL (ref 0.5–1)
EOSINOPHILS ABSOLUTE: 0.04 E9/L (ref 0.05–0.5)
EOSINOPHILS RELATIVE PERCENT: 0.4 % (ref 0–6)
GFR AFRICAN AMERICAN: >60
GFR NON-AFRICAN AMERICAN: >60 ML/MIN/1.73
GLUCOSE BLD-MCNC: 79 MG/DL (ref 74–99)
HBA1C MFR BLD: 6.1 % (ref 4–5.6)
HCT VFR BLD CALC: 24.7 % (ref 34–48)
HCT VFR BLD CALC: 32.5 % (ref 34–48)
HEMOGLOBIN: 10.7 G/DL (ref 11.5–15.5)
HEMOGLOBIN: 8 G/DL (ref 11.5–15.5)
IMMATURE GRANULOCYTES #: 0.06 E9/L
IMMATURE GRANULOCYTES %: 0.6 % (ref 0–5)
LYMPHOCYTES ABSOLUTE: 1.91 E9/L (ref 1.5–4)
LYMPHOCYTES RELATIVE PERCENT: 17.6 % (ref 20–42)
Lab: NORMAL
MCH RBC QN AUTO: 26.9 PG (ref 26–35)
MCH RBC QN AUTO: 27.5 PG (ref 26–35)
MCHC RBC AUTO-ENTMCNC: 32.4 % (ref 32–34.5)
MCHC RBC AUTO-ENTMCNC: 32.9 % (ref 32–34.5)
MCV RBC AUTO: 83.2 FL (ref 80–99.9)
MCV RBC AUTO: 83.5 FL (ref 80–99.9)
METHADONE SCREEN, URINE: NOT DETECTED
MONOCYTES ABSOLUTE: 0.61 E9/L (ref 0.1–0.95)
MONOCYTES RELATIVE PERCENT: 5.6 % (ref 2–12)
NEUTROPHILS ABSOLUTE: 8.21 E9/L (ref 1.8–7.3)
NEUTROPHILS RELATIVE PERCENT: 75.5 % (ref 43–80)
OPIATE SCREEN URINE: NOT DETECTED
OXYCODONE URINE: NOT DETECTED
PDW BLD-RTO: 14 FL (ref 11.5–15)
PDW BLD-RTO: 14.2 FL (ref 11.5–15)
PHENCYCLIDINE SCREEN URINE: NOT DETECTED
PLATELET # BLD: 188 E9/L (ref 130–450)
PLATELET # BLD: 231 E9/L (ref 130–450)
PMV BLD AUTO: 11.1 FL (ref 7–12)
PMV BLD AUTO: 11.3 FL (ref 7–12)
POTASSIUM SERPL-SCNC: 3.9 MMOL/L (ref 3.5–5)
RBC # BLD: 2.97 E12/L (ref 3.5–5.5)
RBC # BLD: 3.89 E12/L (ref 3.5–5.5)
SODIUM BLD-SCNC: 135 MMOL/L (ref 132–146)
TOTAL PROTEIN: 6.9 G/DL (ref 6.4–8.3)
WBC # BLD: 10.9 E9/L (ref 4.5–11.5)
WBC # BLD: 12.6 E9/L (ref 4.5–11.5)

## 2021-05-26 PROCEDURE — 80053 COMPREHEN METABOLIC PANEL: CPT

## 2021-05-26 PROCEDURE — 6360000002 HC RX W HCPCS: Performed by: ADVANCED PRACTICE MIDWIFE

## 2021-05-26 PROCEDURE — 85025 COMPLETE CBC W/AUTO DIFF WBC: CPT

## 2021-05-26 PROCEDURE — 2500000003 HC RX 250 WO HCPCS

## 2021-05-26 PROCEDURE — 1220000001 HC SEMI PRIVATE L&D R&B

## 2021-05-26 PROCEDURE — P9016 RBC LEUKOCYTES REDUCED: HCPCS

## 2021-05-26 PROCEDURE — 6360000002 HC RX W HCPCS

## 2021-05-26 PROCEDURE — 2580000003 HC RX 258: Performed by: ADVANCED PRACTICE MIDWIFE

## 2021-05-26 PROCEDURE — 6370000000 HC RX 637 (ALT 250 FOR IP)

## 2021-05-26 PROCEDURE — 83036 HEMOGLOBIN GLYCOSYLATED A1C: CPT

## 2021-05-26 PROCEDURE — 86901 BLOOD TYPING SEROLOGIC RH(D): CPT

## 2021-05-26 PROCEDURE — 30233N1 TRANSFUSION OF NONAUTOLOGOUS RED BLOOD CELLS INTO PERIPHERAL VEIN, PERCUTANEOUS APPROACH: ICD-10-PCS | Performed by: OBSTETRICS & GYNECOLOGY

## 2021-05-26 PROCEDURE — 0UQMXZZ REPAIR VULVA, EXTERNAL APPROACH: ICD-10-PCS | Performed by: OBSTETRICS & GYNECOLOGY

## 2021-05-26 PROCEDURE — 36415 COLL VENOUS BLD VENIPUNCTURE: CPT

## 2021-05-26 PROCEDURE — 80307 DRUG TEST PRSMV CHEM ANLYZR: CPT

## 2021-05-26 PROCEDURE — 86923 COMPATIBILITY TEST ELECTRIC: CPT

## 2021-05-26 PROCEDURE — 85027 COMPLETE CBC AUTOMATED: CPT

## 2021-05-26 PROCEDURE — 86850 RBC ANTIBODY SCREEN: CPT

## 2021-05-26 PROCEDURE — 86900 BLOOD TYPING SEROLOGIC ABO: CPT

## 2021-05-26 RX ORDER — PENICILLIN G 3000000 [IU]/50ML
3 INJECTION, SOLUTION INTRAVENOUS EVERY 4 HOURS
Status: DISCONTINUED | OUTPATIENT
Start: 2021-05-26 | End: 2021-05-27 | Stop reason: HOSPADM

## 2021-05-26 RX ORDER — ACETAMINOPHEN 325 MG/1
650 TABLET ORAL EVERY 4 HOURS PRN
Status: DISCONTINUED | OUTPATIENT
Start: 2021-05-26 | End: 2021-05-27 | Stop reason: HOSPADM

## 2021-05-26 RX ORDER — MULTIVIT-MIN/IRON/FOLIC ACID/K 18-600-40
2000 CAPSULE ORAL
COMMUNITY

## 2021-05-26 RX ORDER — ONDANSETRON 2 MG/ML
4 INJECTION INTRAMUSCULAR; INTRAVENOUS EVERY 6 HOURS PRN
Status: DISCONTINUED | OUTPATIENT
Start: 2021-05-26 | End: 2021-05-27 | Stop reason: HOSPADM

## 2021-05-26 RX ORDER — SODIUM CHLORIDE, SODIUM LACTATE, POTASSIUM CHLORIDE, CALCIUM CHLORIDE 600; 310; 30; 20 MG/100ML; MG/100ML; MG/100ML; MG/100ML
INJECTION, SOLUTION INTRAVENOUS CONTINUOUS
Status: DISCONTINUED | OUTPATIENT
Start: 2021-05-26 | End: 2021-05-28 | Stop reason: HOSPADM

## 2021-05-26 RX ORDER — LIDOCAINE HYDROCHLORIDE 10 MG/ML
INJECTION, SOLUTION INFILTRATION; PERINEURAL
Status: COMPLETED
Start: 2021-05-26 | End: 2021-05-26

## 2021-05-26 RX ORDER — IBUPROFEN 800 MG/1
800 TABLET ORAL EVERY 8 HOURS PRN
Status: DISCONTINUED | OUTPATIENT
Start: 2021-05-26 | End: 2021-05-28 | Stop reason: HOSPADM

## 2021-05-26 RX ORDER — DOCUSATE SODIUM 100 MG/1
100 CAPSULE, LIQUID FILLED ORAL 2 TIMES DAILY
Status: DISCONTINUED | OUTPATIENT
Start: 2021-05-26 | End: 2021-05-27 | Stop reason: HOSPADM

## 2021-05-26 RX ORDER — ACETAMINOPHEN 650 MG
TABLET, EXTENDED RELEASE ORAL
Status: DISCONTINUED
Start: 2021-05-26 | End: 2021-05-27 | Stop reason: WASHOUT

## 2021-05-26 RX ORDER — SODIUM CHLORIDE 0.9 % (FLUSH) 0.9 %
5-40 SYRINGE (ML) INJECTION EVERY 12 HOURS SCHEDULED
Status: DISCONTINUED | OUTPATIENT
Start: 2021-05-26 | End: 2021-05-27 | Stop reason: HOSPADM

## 2021-05-26 RX ORDER — METHYLERGONOVINE MALEATE 0.2 MG/ML
200 INJECTION INTRAVENOUS PRN
Status: DISCONTINUED | OUTPATIENT
Start: 2021-05-26 | End: 2021-05-27 | Stop reason: HOSPADM

## 2021-05-26 RX ORDER — SODIUM CHLORIDE 9 MG/ML
25 INJECTION, SOLUTION INTRAVENOUS PRN
Status: DISCONTINUED | OUTPATIENT
Start: 2021-05-26 | End: 2021-05-27 | Stop reason: HOSPADM

## 2021-05-26 RX ORDER — SODIUM CHLORIDE 0.9 % (FLUSH) 0.9 %
5-40 SYRINGE (ML) INJECTION PRN
Status: DISCONTINUED | OUTPATIENT
Start: 2021-05-26 | End: 2021-05-27 | Stop reason: HOSPADM

## 2021-05-26 RX ORDER — LIDOCAINE HYDROCHLORIDE 20 MG/ML
JELLY TOPICAL
Status: COMPLETED
Start: 2021-05-26 | End: 2021-05-26

## 2021-05-26 RX ADMIN — BUTORPHANOL TARTRATE 2 MG: 2 INJECTION, SOLUTION INTRAMUSCULAR; INTRAVENOUS at 21:49

## 2021-05-26 RX ADMIN — LIDOCAINE HYDROCHLORIDE 200 MG: 10 INJECTION, SOLUTION INFILTRATION; PERINEURAL at 22:30

## 2021-05-26 RX ADMIN — Medication: at 21:59

## 2021-05-26 RX ADMIN — SODIUM CHLORIDE, POTASSIUM CHLORIDE, SODIUM LACTATE AND CALCIUM CHLORIDE: 600; 310; 30; 20 INJECTION, SOLUTION INTRAVENOUS at 18:54

## 2021-05-26 RX ADMIN — LIDOCAINE HYDROCHLORIDE: 20 JELLY TOPICAL at 20:30

## 2021-05-26 RX ADMIN — LIDOCAINE HYDROCHLORIDE 200 MG: 10 INJECTION, SOLUTION INFILTRATION; PERINEURAL at 22:00

## 2021-05-26 RX ADMIN — PENICILLIN G POTASSIUM: 5000000 INJECTION, POWDER, FOR SOLUTION INTRAMUSCULAR; INTRAVENOUS at 18:55

## 2021-05-26 RX ADMIN — Medication 2 MG: at 21:49

## 2021-05-26 ASSESSMENT — PAIN SCALES - GENERAL
PAINLEVEL_OUTOF10: 10
PAINLEVEL_OUTOF10: 10

## 2021-05-26 NOTE — PROGRESS NOTES
Assumed care , pt transferred to ld10, pt leaking fluid, informed paloma that pt was moved and unable to place IV and start fluids/PCN, informed of IV team consult.

## 2021-05-26 NOTE — PROGRESS NOTES
Chief complaint: Constipation    Referred by: No ref. provider found    HPI:  Shira is a 1 y.o. female presents today for follow up of her chronic constipation. She has had a URI and AOM s/p antibiotics recently. She has had 2-3 loose mash potatoes stool per day. Mom stopped senna 4-5 days ago. Currently taking 1/2 cap miralax daily. No cramping.     6-12mos ago was backed up. Did a clean out with 1/2exlax chew BID for a few days, then started miralax. Happened twice since then.Before each one starts having hard and infrequent stools. Gets miralax 1/2-3/4cap twice a day. Eats fruit, water with occasional milk, some vegetables. Citrus, grapes, apples with peel    Not potty trained    Review of Systems:  Review of Systems   Constitutional: Negative for activity change, appetite change, crying, fever and unexpected weight change.   HENT: Negative for mouth sores and trouble swallowing.    Eyes: Negative for photophobia, pain and redness.   Respiratory: Negative for cough and choking.    Cardiovascular: Negative for cyanosis.   Gastrointestinal: Positive for constipation. Negative for abdominal pain, anal bleeding, blood in stool, diarrhea, nausea and vomiting.   Genitourinary: Negative for dysuria, enuresis and flank pain.   Musculoskeletal: Negative for arthralgias and joint swelling.   Skin: Negative for color change and rash.   Allergic/Immunologic: Negative for environmental allergies, food allergies and immunocompromised state.   Neurological: Negative for headaches.        Medical History:  History reviewed. No pertinent past medical history.  Surgical History:  History reviewed. No pertinent surgical history.  Family History:  Family History   Problem Relation Age of Onset    Hypertension Maternal Grandmother      Copied from mother's family history at birth   IBS - dad, LI  No known celiac although dad was tested  Pat uncle with thyroid     Social History:  Social History     Social History    Marital  S: Breathing through [facility] well. Wife supportive. O: VSS. Fhts 130s mod kayode. Uc q 1.5-2'. VE was just done by RN, so I did not repeat, SROM clear bld tinged at 5pm today. IV and Pcn infusing.      A: Active labor    Mom/baby stable    P: Npo now   Con't expectant mgmt "status: Single     Spouse name: N/A    Number of children: N/A    Years of education: N/A     Occupational History    Not on file.     Social History Main Topics    Smoking status: Never Smoker    Smokeless tobacco: Not on file    Alcohol use Not on file    Drug use: Unknown    Sexual activity: Not on file     Other Topics Concern    Not on file     Social History Narrative    Lives with mom, dad and 5 siblings, no      Home with mom    Physical EXAM  Vitals:    01/08/18 0915   BP: 96/59   Pulse: 107   Resp: 26   Temp: 98.3 °F (36.8 °C)     Wt Readings from Last 3 Encounters:   01/08/18 13.6 kg (29 lb 15.7 oz) (49 %, Z= -0.02)*   12/27/17 13.6 kg (30 lb 1.5 oz) (52 %, Z= 0.05)*   12/14/17 13.9 kg (30 lb 10.3 oz) (60 %, Z= 0.24)*     * Growth percentiles are based on [facility] 2-20 Years data.     Ht Readings from Last 3 Encounters:   01/08/18 3' (0.914 m) (34 %, Z= -0.40)*   11/30/17 2' 11" (0.889 m) (20 %, Z= -0.85)*   03/21/17 2' 9.5" (0.851 m) (43 %, Z= -0.18)*     * Growth percentiles are based on [facility] 2-20 Years data.     Body mass index is 16.27 kg/m².    Physical Exam   Constitutional: She is active.   HENT:   Mouth/Throat: Mucous membranes are moist. Oropharynx is clear.   Eyes: Conjunctivae and EOM are normal.   Neck: Neck supple.   Cardiovascular: Normal rate and regular rhythm.    No murmur heard.  Pulmonary/Chest: Effort normal and breath sounds normal. No respiratory distress.   Abdominal: Soft. Bowel sounds are normal. She exhibits no distension and no mass. There is no tenderness. There is no rebound and no guarding.   Genitourinary:   Genitourinary Comments: Normal perianal area   Musculoskeletal: Normal range of motion.   Neurological: She is alert.   Skin: Skin is warm.   Vitals reviewed.      Records Reviewed: TFT and celiac normal    Assessment/Plan:   Shira is a 1 y.o. female who presents with follow up of her chronic constipation. Currently stooling loose with miralax. She was on " senna until starting antibiotics. These were completed 4 days ago. Will have her resume senna and call in 1 week with an update. We may stop miralax if stools are too loose. In past she has gotten backed up with miralax only. Watch closely. Discussed hydration, high fiber in diet. Hold on potty training.       Constipation, unspecified constipation type      1. 1/2 tab senna nightly  2. 1/2 cap miralax   3. Call in 1 week  4. Hold on potty training     Return in about 4 weeks (around 2/5/2018).

## 2021-05-26 NOTE — ANESTHESIA PRE PROCEDURE
Department of Anesthesiology  Preprocedure Note       Name:  Thomas Villafana   Age:  27 y.o.  :  1995                                          MRN:  [id number]         Date:  2021      Surgeon: * No surgeons listed *    Procedure: * No procedures listed *    Medications prior to admission:   Prior to Admission medications    Medication Sig Start Date End Date Taking?  Authorizing Provider   Prenatal MV-Min-Fe Fum-FA-DHA (PRENATAL 1 PO) Take 1 tablet by mouth daily   Yes Historical Provider, MD   Cholecalciferol (VITAMIN D) 50 MCG (2000) CAPS capsule Take 2,000 Units by mouth   Yes Historical Provider, MD       Current medications:    Current Facility-Administered Medications   Medication Dose Route Frequency Provider Last Rate Last Admin    lactated ringers infusion   Intravenous Continuous HYUN Andrew - PILAR        sodium chloride flush 0.9 % injection 5-40 mL  5-40 mL Intravenous 2 times per day Jessica Degroot APRN - PILAR        sodium chloride flush 0.9 % injection 5-40 mL  5-40 mL Intravenous PRN Jessica Degroot APRN - CNM        0.9 % sodium chloride infusion  25 mL Intravenous PRN Jessica Degroot APRN - PILAR        ondansetron (ZOFRAN) injection 4 mg  4 mg Intravenous Q6H PRN Jessica Degroot APRN - PILAR        methylergonovine (METHERGINE) injection 200 mcg  200 mcg Intramuscular PRN Jessica Degroot, APRN - CNSEAMUS        acetaminophen (TYLENOL) tablet 650 mg  650 mg Oral Q4H PRN HYUN Astorga - PILAR        witch hazel-glycerin (TUCKS) pad   Topical PRN Jessica Lynch APRN - PILAR        benzocaine-menthol (DERMOPLAST) 20-0.5 % spray   Topical PRN HYUN Andrew - PILAR        docusate sodium (COLACE) capsule 100 mg  100 mg Oral BID HYUN Andrew - PILAR        hydrocortisone 2.5 % cream   Topical Q2H PRN Jessica Degroot APRN - PILAR        penicillin G potassium 5 Million Units in dextrose 5 % 100 mL IVPB (mini-bag)  5 Million Units Intravenous Once HYUN Andrew CNM        Followed by   [facility] penicillin G potassium IVPB 3 Million Units  3 Million Units Intravenous Q4H HYUN Andrew CNM           Allergies:  No Known Allergies    Problem List:    Patient Active Problem List   Diagnosis Code    Normal labor O80, Z37.9    Gestational diabetes mellitus (GDM) O24.419    Group B streptococcal carriage complicating pregnancy Q41.881    Morbid obesity ([facility] [address] E66.01    39 weeks gestation of pregnancy Z3A.39       Past Medical History:        Diagnosis Date    Diabetes mellitus ([facility] [address]        Past Surgical History:        Procedure Laterality Date    WRIST FRACTURE SURGERY Right 5/31/2019    RIGHT WRIST OPEN REDUCTION INTERNAL FIXATION performed by Bennett Young DO at [facility] [address] History:    Social History     Tobacco Use    Smoking status: Former Smoker    Smokeless tobacco: Never Used   Substance Use Topics    Alcohol use: Not Currently     Comment: occ                                Counseling given: Not Answered      Vital Signs (Current):   Vitals:    05/26/21 1800 05/26/21 1813   BP:  133/78   Pulse:  70   Resp:  18   Temp:  36.6 °C (97.9 °F)   TempSrc:  Oral   Weight: 264 lb (119.7 kg)    Height: 5' 1\" (1.549 m)                                               BP Readings from Last 3 Encounters:   05/26/21 133/78   05/31/19 114/77   05/31/19 108/70       NPO Status:                                                                                 BMI:   Wt Readings from Last 3 Encounters:   05/26/21 264 lb (119.7 kg)   07/29/19 235 lb 0.2 oz (106.6 kg)   07/01/19 235 lb (106.6 kg)     Body mass index is 49.88 kg/m².     CBC: No results found for: WBC, RBC, HGB, HCT, MCV, RDW, PLT    CMP: No results found for: NA, K, CL, CO2, BUN, CREATININE, GFRAA, AGRATIO, LABGLOM, GLUCOSE, PROT, CALCIUM, BILITOT, ALKPHOS, AST, ALT    POC Tests: No results for input(s): POCGLU, POCNA, POCK, POCCL, POCBUN, Lavinia Ashish in the last 72 hours. Coags: No results found for: PROTIME, INR, APTT    HCG (If Applicable):   Lab Results   Component Value Date    PREGTESTUR NEGATIVE 05/31/2019        ABGs: No results found for: PHART, PO2ART, YFG4UGD, BGP2YCU, BEART, P1FZKVFE     Type & Screen (If Applicable):  No results found for: LABABO, LABRH    Drug/Infectious Status (If Applicable):  No results found for: HIV, HEPCAB    COVID-19 Screening (If Applicable): No results found for: COVID19        Anesthesia Evaluation    Airway: Mallampati: III  TM distance: >3 FB   Neck ROM: full  Mouth opening: > = 3 FB Dental: normal exam         Pulmonary:Negative Pulmonary ROS and normal exam  breath sounds clear to auscultation            Patient did not smoke on day of surgery. Cardiovascular:Negative CV ROS            Rhythm: regular  Rate: normal           Beta Blocker:  Not on Beta Blocker         Neuro/Psych:   Negative Neuro/Psych ROS              GI/Hepatic/Renal: Neg GI/Hepatic/Renal ROS            Endo/Other:    (+) Diabetes (Gestational Diabetes)well controlled, , .                 Abdominal:           Vascular: negative vascular ROS. Anesthesia Plan      general and epidural     ASA 2       Induction: intravenous. MIPS: Prophylactic antiemetics administered. Anesthetic plan and risks discussed with patient. Use of blood products discussed with patient whom consented to blood products. Plan discussed with CRNA and attending.                   Casey Woodard RN   5/26/2021

## 2021-05-26 NOTE — PROGRESS NOTES
S: Resting. Uc were q 2'-3', now spacing q 2-5'. Nervous d/t first baby. Partner at [facility] supportive. Smiling. Comfortable. O: VSS. Fhts 130s mod kayode, accels. Uc spacing was q2' on admission, now q2-5'. VE: 3cm/70%/-2, [place] present. Last u/s EFW 8#, Pt apx 266#. 5'1\". GDM diet controlled.      A: Early Labor   Mom/baby stable   GDM - diet controlled   Morbid obesity   GBS+    P: Dr Adame Covert notified   Routine orders   IVFs   Pcn prophylaxis   Ambulate   Expect

## 2021-05-26 NOTE — PROGRESS NOTES
Jessica QUEZADA updated on sve 5-6cm and vaginal bleeding, no clots, steady trickle, informed of SROM pt stating arounf 1700. States she will be here soon.

## 2021-05-27 PROBLEM — O24.419 GESTATIONAL DIABETES MELLITUS (GDM): Status: RESOLVED | Noted: 2021-05-26 | Resolved: 2021-05-27

## 2021-05-27 PROBLEM — O99.820 GROUP B STREPTOCOCCAL CARRIAGE COMPLICATING PREGNANCY: Status: RESOLVED | Noted: 2021-05-26 | Resolved: 2021-05-27

## 2021-05-27 PROBLEM — Z3A.39 39 WEEKS GESTATION OF PREGNANCY: Status: RESOLVED | Noted: 2021-05-26 | Resolved: 2021-05-27

## 2021-05-27 PROBLEM — Z37.9 NORMAL LABOR: Status: RESOLVED | Noted: 2021-05-26 | Resolved: 2021-05-27

## 2021-05-27 LAB
HCT VFR BLD CALC: 22 % (ref 34–48)
HEMOGLOBIN: 7 G/DL (ref 11.5–15.5)

## 2021-05-27 PROCEDURE — 85014 HEMATOCRIT: CPT

## 2021-05-27 PROCEDURE — 1220000000 HC SEMI PRIVATE OB R&B

## 2021-05-27 PROCEDURE — 85018 HEMOGLOBIN: CPT

## 2021-05-27 PROCEDURE — 6370000000 HC RX 637 (ALT 250 FOR IP): Performed by: ADVANCED PRACTICE MIDWIFE

## 2021-05-27 PROCEDURE — 2580000003 HC RX 258: Performed by: ADVANCED PRACTICE MIDWIFE

## 2021-05-27 PROCEDURE — 7200000001 HC VAGINAL DELIVERY

## 2021-05-27 PROCEDURE — 36415 COLL VENOUS BLD VENIPUNCTURE: CPT

## 2021-05-27 RX ORDER — SODIUM CHLORIDE, SODIUM LACTATE, POTASSIUM CHLORIDE, CALCIUM CHLORIDE 600; 310; 30; 20 MG/100ML; MG/100ML; MG/100ML; MG/100ML
INJECTION, SOLUTION INTRAVENOUS CONTINUOUS
Status: DISCONTINUED | OUTPATIENT
Start: 2021-05-27 | End: 2021-05-27 | Stop reason: ALTCHOICE

## 2021-05-27 RX ORDER — PSEUDOEPHEDRINE HCL 30 MG
100 TABLET ORAL 2 TIMES DAILY PRN
COMMUNITY
Start: 2021-05-27

## 2021-05-27 RX ORDER — MODIFIED LANOLIN
OINTMENT (GRAM) TOPICAL PRN
Status: DISCONTINUED | OUTPATIENT
Start: 2021-05-27 | End: 2021-05-28 | Stop reason: HOSPADM

## 2021-05-27 RX ORDER — FERROUS SULFATE 325(65) MG
325 TABLET ORAL 2 TIMES DAILY WITH MEALS
Status: DISCONTINUED | OUTPATIENT
Start: 2021-05-27 | End: 2021-05-28 | Stop reason: HOSPADM

## 2021-05-27 RX ORDER — SODIUM CHLORIDE 0.9 % (FLUSH) 0.9 %
5-40 SYRINGE (ML) INJECTION EVERY 12 HOURS SCHEDULED
Status: DISCONTINUED | OUTPATIENT
Start: 2021-05-27 | End: 2021-05-28 | Stop reason: HOSPADM

## 2021-05-27 RX ORDER — SODIUM CHLORIDE 0.9 % (FLUSH) 0.9 %
5-40 SYRINGE (ML) INJECTION PRN
Status: DISCONTINUED | OUTPATIENT
Start: 2021-05-27 | End: 2021-05-28 | Stop reason: HOSPADM

## 2021-05-27 RX ORDER — DOCUSATE SODIUM 100 MG/1
100 CAPSULE, LIQUID FILLED ORAL 2 TIMES DAILY
Status: DISCONTINUED | OUTPATIENT
Start: 2021-05-27 | End: 2021-05-28 | Stop reason: HOSPADM

## 2021-05-27 RX ORDER — FERROUS SULFATE 325(65) MG
325 TABLET ORAL 2 TIMES DAILY WITH MEALS
Qty: 30 TABLET | Refills: 3 | COMMUNITY
Start: 2021-05-28

## 2021-05-27 RX ORDER — SODIUM CHLORIDE 9 MG/ML
25 INJECTION, SOLUTION INTRAVENOUS PRN
Status: DISCONTINUED | OUTPATIENT
Start: 2021-05-27 | End: 2021-05-28 | Stop reason: HOSPADM

## 2021-05-27 RX ORDER — IBUPROFEN 800 MG/1
800 TABLET ORAL EVERY 8 HOURS PRN
Qty: 30 TABLET | Refills: 0 | Status: SHIPPED | OUTPATIENT
Start: 2021-05-27

## 2021-05-27 RX ORDER — ACETAMINOPHEN 325 MG/1
650 TABLET ORAL EVERY 4 HOURS PRN
Status: DISCONTINUED | OUTPATIENT
Start: 2021-05-27 | End: 2021-05-28 | Stop reason: HOSPADM

## 2021-05-27 RX ADMIN — FERROUS SULFATE TAB 325 MG (65 MG ELEMENTAL FE) 325 MG: 325 (65 FE) TAB at 09:03

## 2021-05-27 RX ADMIN — SODIUM CHLORIDE, PRESERVATIVE FREE 10 ML: 5 INJECTION INTRAVENOUS at 20:44

## 2021-05-27 RX ADMIN — SODIUM CHLORIDE, PRESERVATIVE FREE 10 ML: 5 INJECTION INTRAVENOUS at 10:32

## 2021-05-27 RX ADMIN — DOCUSATE SODIUM 100 MG: 100 CAPSULE, LIQUID FILLED ORAL at 09:03

## 2021-05-27 RX ADMIN — FERROUS SULFATE TAB 325 MG (65 MG ELEMENTAL FE) 325 MG: 325 (65 FE) TAB at 01:53

## 2021-05-27 RX ADMIN — FERROUS SULFATE TAB 325 MG (65 MG ELEMENTAL FE) 325 MG: 325 (65 FE) TAB at 18:16

## 2021-05-27 RX ADMIN — IBUPROFEN 800 MG: 800 TABLET, FILM COATED ORAL at 18:16

## 2021-05-27 RX ADMIN — IBUPROFEN 800 MG: 800 TABLET, FILM COATED ORAL at 10:06

## 2021-05-27 RX ADMIN — DOCUSATE SODIUM 100 MG: 100 CAPSULE, LIQUID FILLED ORAL at 20:43

## 2021-05-27 RX ADMIN — Medication: at 01:53

## 2021-05-27 RX ADMIN — SODIUM CHLORIDE, POTASSIUM CHLORIDE, SODIUM LACTATE AND CALCIUM CHLORIDE: 600; 310; 30; 20 INJECTION, SOLUTION INTRAVENOUS at 09:06

## 2021-05-27 RX ADMIN — IBUPROFEN 800 MG: 800 TABLET, FILM COATED ORAL at 01:41

## 2021-05-27 RX ADMIN — SODIUM CHLORIDE, PRESERVATIVE FREE 10 ML: 5 INJECTION INTRAVENOUS at 09:08

## 2021-05-27 ASSESSMENT — PAIN SCALES - GENERAL
PAINLEVEL_OUTOF10: 4
PAINLEVEL_OUTOF10: 2

## 2021-05-27 ASSESSMENT — PAIN DESCRIPTION - ONSET: ONSET: GRADUAL

## 2021-05-27 ASSESSMENT — PAIN DESCRIPTION - LOCATION: LOCATION: ABDOMEN

## 2021-05-27 ASSESSMENT — PAIN DESCRIPTION - DESCRIPTORS: DESCRIPTORS: CRAMPING

## 2021-05-27 ASSESSMENT — PAIN DESCRIPTION - PAIN TYPE: TYPE: ACUTE PAIN

## 2021-05-27 ASSESSMENT — PAIN DESCRIPTION - FREQUENCY: FREQUENCY: INTERMITTENT

## 2021-05-27 ASSESSMENT — PAIN - FUNCTIONAL ASSESSMENT: PAIN_FUNCTIONAL_ASSESSMENT: PREVENTS OR INTERFERES SOME ACTIVE ACTIVITIES AND ADLS

## 2021-05-27 ASSESSMENT — PAIN DESCRIPTION - ORIENTATION: ORIENTATION: LOWER

## 2021-05-27 ASSESSMENT — PAIN DESCRIPTION - PROGRESSION: CLINICAL_PROGRESSION: GRADUALLY WORSENING

## 2021-05-27 NOTE — PROGRESS NOTES
Patient admitted into room, oriented to surroundings and admission paperwork. Parents given the [facility] informational sheet, and contents explained. Educated parents on safe sleep practices for baby. Instructed to call for the first time up to bathroom. Fundus midline, firm and palpated at U/U. Small amount of rubra lochia noted with no clots. Patient denies any pain or dizziness while sitting up in bed. Vital signs stable upon admission to the unit. Phone at patient's side, instructed to use it for any needs. Refusing Tdap.

## 2021-05-27 NOTE — LACTATION NOTE
Encouraged to offer frequent feedings. Education given on hunger cues. Reviewed signs of adequate I & O;allow baby to feed ad noah & not to limit time at breast. Discussed ways to awaken baby for feedings including skin to skin. Instructed that baby may also feed 8-12 times a day-cluster feeding at times -as her milk supply is being established. Discussed avoiding pacifiers during the first few weeks & encouraged rooming in. Given Lactation contact & [address]. Had EBP for home.

## 2021-05-27 NOTE — PLAN OF CARE
Problem: Discharge Planning:  Goal: Discharged to appropriate level of care  Description: Discharged to appropriate level of care  5/27/2021 0521 by Alexi García RN  Outcome: Ongoing  5/27/2021 0438 by Alexi García RN  Outcome: Ongoing     Problem: Constipation:  Goal: Bowel elimination is within specified parameters  Description: Bowel elimination is within specified parameters  5/27/2021 0521 by Alexi García RN  Outcome: Ongoing  5/27/2021 0438 by Alexi García RN  Outcome: Ongoing     Problem: Fluid Volume - Imbalance:  Goal: Absence of imbalanced fluid volume signs and symptoms  Description: Absence of imbalanced fluid volume signs and symptoms  5/27/2021 0521 by Alexi García RN  Outcome: Ongoing  5/27/2021 0438 by Alexi García RN  Outcome: Ongoing  Goal: Absence of postpartum hemorrhage signs and symptoms  Description: Absence of postpartum hemorrhage signs and symptoms  5/27/2021 0521 by Alexi García RN  Outcome: Ongoing  5/27/2021 0438 by Alexi García RN  Outcome: Ongoing     Problem: Infection - Risk of, Puerperal Infection:  Goal: Will show no infection signs and symptoms  Description: Will show no infection signs and symptoms  5/27/2021 0521 by Alexi García RN  Outcome: Ongoing  5/27/2021 0438 by Alexi García RN  Outcome: Ongoing     Problem: Mood - Altered:  Goal: Mood stable  Description: Mood stable  5/27/2021 0521 by Alexi García RN  Outcome: Ongoing  5/27/2021 0438 by Alexi García RN  Outcome: Ongoing     Problem: Pain - Acute:  Goal: Pain level will decrease  Description: Pain level will decrease  5/27/2021 0521 by Alexi García RN  Outcome: Ongoing  5/27/2021 0438 by Alexi García RN  Outcome: Ongoing

## 2021-05-27 NOTE — PROGRESS NOTES
[facility] [place] Hearing screening results were discussed with parent. Questions answered. Brochure given to parent. Advised to monitor developmental milestones and contact physician for any concerns.    Brandi Monroe, [place]

## 2021-05-27 NOTE — PROGRESS NOTES
of viable baby girl at . Delayed cord clamping completed. Skin to skin initiated immediately after delivery. apgars 8/9.

## 2021-05-27 NOTE — PROGRESS NOTES
Jessica at bedside for SVE. Pt involuntarily pushing with ctx, educating patient on breathing techniques to get through ctx.

## 2021-05-27 NOTE — PROGRESS NOTES
S: Moaning through uc, control is good, but pushing towards end of uc. O: Vss. Fhts 130s, mod kayode, accels. Uc coupling q2-4'. VE: 8cm/90%/0. Flds remain clear/bld tinged. H&H 10.7/32.5, Wbc's 10.9. Plt 231. UDS (-).  Cmp: wnl except Alk phos 157, AST 27.    A: Transitional labor   Mom/baby stable    P: Colleen Able   Dr Elida Dasilva here and aware

## 2021-05-28 VITALS
WEIGHT: 264 LBS | TEMPERATURE: 98.2 F | HEIGHT: 61 IN | RESPIRATION RATE: 16 BRPM | BODY MASS INDEX: 49.84 KG/M2 | SYSTOLIC BLOOD PRESSURE: 125 MMHG | HEART RATE: 83 BPM | DIASTOLIC BLOOD PRESSURE: 75 MMHG

## 2021-05-28 LAB
BLOOD BANK DISPENSE STATUS: NORMAL
BLOOD BANK DISPENSE STATUS: NORMAL
BLOOD BANK PRODUCT CODE: NORMAL
BLOOD BANK PRODUCT CODE: NORMAL
BPU ID: NORMAL
BPU ID: NORMAL
DESCRIPTION BLOOD BANK: NORMAL
DESCRIPTION BLOOD BANK: NORMAL
HCT VFR BLD CALC: 20 % (ref 34–48)
HCT VFR BLD CALC: 26.8 % (ref 34–48)
HEMOGLOBIN: 6.5 G/DL (ref 11.5–15.5)
HEMOGLOBIN: 8.6 G/DL (ref 11.5–15.5)

## 2021-05-28 PROCEDURE — 85018 HEMOGLOBIN: CPT

## 2021-05-28 PROCEDURE — 36430 TRANSFUSION BLD/BLD COMPNT: CPT

## 2021-05-28 PROCEDURE — 6370000000 HC RX 637 (ALT 250 FOR IP): Performed by: ADVANCED PRACTICE MIDWIFE

## 2021-05-28 PROCEDURE — 85014 HEMATOCRIT: CPT

## 2021-05-28 PROCEDURE — 2580000003 HC RX 258: Performed by: ADVANCED PRACTICE MIDWIFE

## 2021-05-28 PROCEDURE — P9016 RBC LEUKOCYTES REDUCED: HCPCS

## 2021-05-28 PROCEDURE — 36415 COLL VENOUS BLD VENIPUNCTURE: CPT

## 2021-05-28 RX ORDER — SODIUM CHLORIDE 9 MG/ML
INJECTION, SOLUTION INTRAVENOUS PRN
Status: DISCONTINUED | OUTPATIENT
Start: 2021-05-28 | End: 2021-05-28 | Stop reason: HOSPADM

## 2021-05-28 RX ADMIN — IBUPROFEN 800 MG: 800 TABLET, FILM COATED ORAL at 02:17

## 2021-05-28 RX ADMIN — SODIUM CHLORIDE, PRESERVATIVE FREE 10 ML: 5 INJECTION INTRAVENOUS at 09:19

## 2021-05-28 RX ADMIN — FERROUS SULFATE TAB 325 MG (65 MG ELEMENTAL FE) 325 MG: 325 (65 FE) TAB at 09:18

## 2021-05-28 RX ADMIN — IBUPROFEN 800 MG: 800 TABLET, FILM COATED ORAL at 17:08

## 2021-05-28 RX ADMIN — DOCUSATE SODIUM 100 MG: 100 CAPSULE, LIQUID FILLED ORAL at 09:17

## 2021-05-28 RX ADMIN — ACETAMINOPHEN 650 MG: 325 TABLET ORAL at 09:18

## 2021-05-28 RX ADMIN — Medication: at 09:17

## 2021-05-28 RX ADMIN — FERROUS SULFATE TAB 325 MG (65 MG ELEMENTAL FE) 325 MG: 325 (65 FE) TAB at 17:08

## 2021-05-28 ASSESSMENT — PAIN SCALES - GENERAL
PAINLEVEL_OUTOF10: 0
PAINLEVEL_OUTOF10: 5
PAINLEVEL_OUTOF10: 0
PAINLEVEL_OUTOF10: 3
PAINLEVEL_OUTOF10: 4

## 2021-05-28 ASSESSMENT — PAIN DESCRIPTION - PROGRESSION: CLINICAL_PROGRESSION: RESOLVED

## 2021-05-28 NOTE — PROGRESS NOTES
S: Was sleeping. Doing well. Up ambulating and voiding w/o diff. Nml bld, no clots. Motrin effective for pain. Breastfeeding well. Denies vertigo, n/v or h/a. Wants to be discharged in am. Taking iron bid. O: VSS. H&H 7/22 from 10.7/32.5 on adm. Breasts soft filling. Fundus firm. Lochia nml. Laceration healing w/o comp. Was using ice, can start sitz bath now. BLE neg edema/neg homans.      A: 1st PPD   BSF    MERLINE - Postpartum Hemorrhage    P: Repeat  H&H in am   Can be discharged tomorrow (pending labs)   Rto 2wk and 6wk   No driving   Discharge instructions reviewed   Rx: Motrin and iron

## 2021-05-28 NOTE — LACTATION NOTE
Patient reports breastfeeding is going fairly well and breasts are filling. C/o nipple tenderness. Reviewed positioning and proper latch techniques to improve comfort. Declined latch assessment. Reviewed importance of effective milk removal to stimulate and maintain supply. Encouraged to call with concerns.

## 2021-05-29 NOTE — PROGRESS NOTES
Discharge instructions reviewed with mother and significant other. Infant hugs tag removed from infants foot. Patient aware of follow up appointment. Patient denies any questions or concerns at this time.

## 2021-05-29 NOTE — PROGRESS NOTES
Spoke with Dr. Lizzette Smith at this time. Made aware of H&H results. States its okay for patient to be discharged.

## 2024-05-09 ENCOUNTER — HOSPITAL ENCOUNTER (OUTPATIENT)
Age: 29
Discharge: HOME | End: 2024-05-09
Payer: COMMERCIAL

## 2024-05-09 LAB
ALBUMIN SERPL-MCNC: 4.2 G/DL (ref 3.5–5.2)
ALP SERPL-CCNC: 49 U/L (ref 35–104)
ALT SERPL-CCNC: 17 U/L (ref 0–32)
ANION GAP SERPL CALCULATED.3IONS-SCNC: 9 MMOL/L (ref 7–16)
AST SERPL-CCNC: 16 U/L (ref 0–31)
BASOPHILS # BLD: 0.02 K/UL (ref 0–0.2)
BASOPHILS NFR BLD: 0 % (ref 0–2)
BILIRUB SERPL-MCNC: 0.3 MG/DL (ref 0–1.2)
BUN SERPL-MCNC: 7 MG/DL (ref 6–20)
CALCIUM SERPL-MCNC: 9 MG/DL (ref 8.6–10.2)
CHLORIDE SERPL-SCNC: 110 MMOL/L (ref 98–107)
CHOLEST SERPL-MCNC: 153 MG/DL
CO2 SERPL-SCNC: 26 MMOL/L (ref 22–29)
CREAT SERPL-MCNC: 0.6 MG/DL (ref 0.5–1)
EOSINOPHIL # BLD: 0.05 K/UL (ref 0.05–0.5)
EOSINOPHILS RELATIVE PERCENT: 1 % (ref 0–6)
ERYTHROCYTE [DISTWIDTH] IN BLOOD BY AUTOMATED COUNT: 13 % (ref 11.5–15)
GFR, ESTIMATED: >90 ML/MIN/1.73M2
GLUCOSE SERPL-MCNC: 102 MG/DL (ref 74–99)
HCT VFR BLD AUTO: 39.3 % (ref 34–48)
HDLC SERPL-MCNC: 50 MG/DL
HGB BLD-MCNC: 13.2 G/DL (ref 11.5–15.5)
IMM GRANULOCYTES # BLD AUTO: <0.03 K/UL (ref 0–0.58)
IMM GRANULOCYTES NFR BLD: 0 % (ref 0–5)
LDLC SERPL CALC-MCNC: 93 MG/DL
LYMPHOCYTES NFR BLD: 2.13 K/UL (ref 1.5–4)
LYMPHOCYTES RELATIVE PERCENT: 43 % (ref 20–42)
MCH RBC QN AUTO: 28.2 PG (ref 26–35)
MCHC RBC AUTO-ENTMCNC: 33.6 G/DL (ref 32–34.5)
MCV RBC AUTO: 84 FL (ref 80–99.9)
MONOCYTES NFR BLD: 0.25 K/UL (ref 0.1–0.95)
MONOCYTES NFR BLD: 5 % (ref 2–12)
NEUTROPHILS NFR BLD: 51 % (ref 43–80)
NEUTS SEG NFR BLD: 2.51 K/UL (ref 1.8–7.3)
PLATELET # BLD AUTO: 245 K/UL (ref 130–450)
PMV BLD AUTO: 11.4 FL (ref 7–12)
POTASSIUM SERPL-SCNC: 4.5 MMOL/L (ref 3.5–5)
PROT SERPL-MCNC: 7.2 G/DL (ref 6.4–8.3)
RBC # BLD AUTO: 4.68 M/UL (ref 3.5–5.5)
SODIUM SERPL-SCNC: 145 MMOL/L (ref 132–146)
T4 FREE SERPL-MCNC: 1.1 NG/DL (ref 0.9–1.7)
TRIGL SERPL-MCNC: 50 MG/DL
TSH SERPL DL<=0.05 MIU/L-ACNC: 1.87 UIU/ML (ref 0.27–4.2)
VLDLC SERPL CALC-MCNC: 10 MG/DL
WBC OTHER # BLD: 5 K/UL (ref 4.5–11.5)

## 2024-05-09 PROCEDURE — 85025 COMPLETE CBC W/AUTO DIFF WBC: CPT

## 2024-05-09 PROCEDURE — 84443 ASSAY THYROID STIM HORMONE: CPT

## 2024-05-09 PROCEDURE — 80053 COMPREHEN METABOLIC PANEL: CPT

## 2024-05-09 PROCEDURE — 84439 ASSAY OF FREE THYROXINE: CPT

## 2024-05-09 PROCEDURE — 36415 COLL VENOUS BLD VENIPUNCTURE: CPT

## 2024-05-09 PROCEDURE — 80061 LIPID PANEL: CPT

## 2024-08-15 ENCOUNTER — HOSPITAL ENCOUNTER (OUTPATIENT)
Age: 29
Discharge: HOME | End: 2024-08-15
Payer: COMMERCIAL

## 2024-08-15 LAB
SEND OUT REPORT: NORMAL
TEST NAME: NORMAL

## 2024-08-15 PROCEDURE — 36415 COLL VENOUS BLD VENIPUNCTURE: CPT

## 2024-08-15 PROCEDURE — 83516 IMMUNOASSAY NONANTIBODY: CPT

## 2024-08-19 LAB
TISSUE TRANSGLUTAMINASE ANTIBODY IGG: <0.6 U/ML
TTG IGA SER IA-ACNC: 0.6 U/ML

## 2024-08-28 ENCOUNTER — HOSPITAL ENCOUNTER (EMERGENCY)
Age: 29
Discharge: HOME | End: 2024-08-28
Payer: COMMERCIAL

## 2024-08-28 VITALS
HEART RATE: 82 BPM | RESPIRATION RATE: 18 BRPM | SYSTOLIC BLOOD PRESSURE: 118 MMHG | OXYGEN SATURATION: 99 % | DIASTOLIC BLOOD PRESSURE: 77 MMHG | TEMPERATURE: 98.4 F

## 2024-08-28 DIAGNOSIS — S91.331A PUNCTURE WOUND OF RIGHT FOOT, INITIAL ENCOUNTER: Primary | ICD-10-CM

## 2024-08-28 PROCEDURE — 99211 OFF/OP EST MAY X REQ PHY/QHP: CPT

## 2024-08-28 PROCEDURE — 90471 IMMUNIZATION ADMIN: CPT | Performed by: PHYSICIAN ASSISTANT

## 2024-08-28 PROCEDURE — 6360000002 HC RX W HCPCS: Performed by: PHYSICIAN ASSISTANT

## 2024-08-28 PROCEDURE — 90715 TDAP VACCINE 7 YRS/> IM: CPT | Performed by: PHYSICIAN ASSISTANT

## 2024-08-28 RX ORDER — CEPHALEXIN 500 MG/1
500 CAPSULE ORAL 4 TIMES DAILY
Qty: 20 CAPSULE | Refills: 0 | Status: SHIPPED | OUTPATIENT
Start: 2024-08-28 | End: 2024-09-02

## 2024-08-28 RX ADMIN — TETANUS TOXOID, REDUCED DIPHTHERIA TOXOID AND ACELLULAR PERTUSSIS VACCINE, ADSORBED 0.5 ML: 5; 2.5; 8; 8; 2.5 SUSPENSION INTRAMUSCULAR at 12:21

## 2024-08-28 NOTE — ED PROVIDER NOTES
[facility]  EMERGENCY DEPARTMENT ENCOUNTER        NAME: Tyrone Davis  :  1995  MRN:  [id number]  Date of evaluation: 2024  Provider: Tien Severino PA-C  PCP: Bernie Bojorquez APRN - CNP  Note Started : 11:28 AM EDT 24    Chief Complaint: Foot Injury (Stepped on a kurtis nail needs a tetanus shot)      This is a 29 year old female that presents to  with family. She states she was barefoot today and stepped on a nail that punctured the heel area of her right foot. She states the nail did not go far and did not break off. She thinks last Td was over 10 years ago. No bony pain. No numbness or tingling. No fever/chills. On first contact with patient, the patient is in no acute distress. Denies current pregnancy or breastfeeding.           Review of Systems  Pertinent positives and negatives are stated within HPI, all other systems reviewed and are negative.     Allergies: Patient has no known allergies.     --------------------------------------------- PAST HISTORY ---------------------------------------------  Past Medical History:  has a past medical history of Diabetes mellitus (HCC).    Past Surgical History:  has a past surgical history that includes Wrist fracture surgery (Right, 2019) and Wrist surgery.    Social History:  reports that she has quit smoking. She has never used smokeless tobacco. She reports that she does not currently use alcohol. She reports that she does not use drugs.    Family History: family history is not on file.     The patient’s home medications have been reviewed.    The nursing notes within the ED encounter have been reviewed.     ------------------------------------------------SCREENINGS----------------------------------------------                        CIWA Assessment  BP: 118/77  Pulse: 82           ---------------------------------------------PHYSICAL EXAM --------------------------------------------    Vitals:    24  1117   BP: 118/77   Pulse: 82   Resp: 18   Temp: 98.4 °F (36.9 °C)   SpO2: 99%     Oxygen Saturation Interpretation: Normal     Physical Exam  Vitals and nursing note reviewed.   Constitutional:       General: She is not in acute distress.     Appearance: Normal appearance. She is well-developed.   HENT:      Head: Normocephalic and atraumatic.      Jaw: There is normal jaw occlusion.      Right Ear: Hearing and external ear normal.      Left Ear: Hearing and external ear normal.      Nose: Nose normal.      Mouth/Throat:      Mouth: Mucous membranes are moist.      Pharynx: Oropharynx is clear. Uvula midline.      Comments: Oropharynx is patent.     Eyes:      Pupils: Pupils are equal, round, and reactive to light.   Cardiovascular:      Rate and Rhythm: Normal rate and regular rhythm.      Heart sounds: Normal heart sounds.   Pulmonary:      Effort: Pulmonary effort is normal.      Breath sounds: Normal breath sounds.   Musculoskeletal:      Cervical back: Full passive range of motion without pain, normal range of motion and neck supple.      Comments: No bony pain to right foot.    Skin:     General: Skin is warm and dry.      Findings: Wound present. No rash.      Comments: <2mm diameter puncture wound to right heel. No bleeding. No FB. No red streaking. Has palp pedal pulse. No cyanosis.    Neurological:      General: No focal deficit present.      Mental Status: She is alert and oriented to person, place, and time.      Cranial Nerves: Cranial nerves 2-12 are intact.      Sensory: Sensation is intact.      Motor: Motor function is intact.      Coordination: Coordination is intact.      Gait: Gait is intact.         -------------------------------------------------- RESULTS -------------------------------------------------  No results found for this visit on 08/28/24.  No orders to display       Labs Reviewed - No data to display    When ordered only abnormal lab results are displayed. All other labs were

## 2024-08-31 LAB
SEND OUT REPORT: NORMAL
TEST NAME: NORMAL

## 2024-09-05 ENCOUNTER — HOSPITAL ENCOUNTER (EMERGENCY)
Age: 29
Discharge: HOME | End: 2024-09-05
Payer: COMMERCIAL

## 2024-09-05 VITALS
HEART RATE: 83 BPM | WEIGHT: 206 LBS | TEMPERATURE: 98.2 F | SYSTOLIC BLOOD PRESSURE: 141 MMHG | DIASTOLIC BLOOD PRESSURE: 87 MMHG | RESPIRATION RATE: 20 BRPM | BODY MASS INDEX: 38.92 KG/M2 | OXYGEN SATURATION: 100 %

## 2024-09-05 DIAGNOSIS — B37.2 INTERTRIGO OF GENITOCRURAL REGION DUE TO CANDIDA SPECIES: Primary | ICD-10-CM

## 2024-09-05 PROCEDURE — 99211 OFF/OP EST MAY X REQ PHY/QHP: CPT

## 2024-09-05 RX ORDER — FLUCONAZOLE 50 MG/1
50 TABLET ORAL DAILY
Qty: 7 TABLET | Refills: 0 | Status: SHIPPED | OUTPATIENT
Start: 2024-09-05 | End: 2024-09-12

## 2024-09-05 RX ORDER — NYSTATIN 100000 [USP'U]/G
POWDER TOPICAL
Qty: 60 G | Refills: 0 | Status: SHIPPED | OUTPATIENT
Start: 2024-09-05

## 2024-09-05 ASSESSMENT — PAIN - FUNCTIONAL ASSESSMENT: PAIN_FUNCTIONAL_ASSESSMENT: NONE - DENIES PAIN

## 2024-09-05 NOTE — DISCHARGE INSTRUCTIONS
Diflucan once daily for 7 days.  Mycostatin powder 4 times daily directly to the area.  Please try to keep the area as dry as possible.  You may even use a blow dryer.  Do not use anymore which she is on the area it will irritate the area.

## 2024-09-05 NOTE — ED PROVIDER NOTES
consistent with age, NAD.  HEENT:  NC/NT. PERRLA.  Airway patent.  Neck:  Supple.  No lymphadenopathy.  Respiratory:  Clear to auscultation and breath sounds equal.  CV:  Regular rate and rhythm.  GI:  normal appearing, non-distended with no visible hernias.       Bowel sounds: normal bowel sounds.           Tenderness: No abdominal tenderness, guarding, rebound, rigidity or pulsatile mass..        Liver: non-tender.               Spleen:  non-tender.  Back: CVA Tenderness: No CVA tenderness.  : Pelvic Exam: (Same sex / third party chaperone present during examination).       External Genitalia: Abnormal findings: Patient has a candidal rash noted to the vulva and inner thighs..       Urethral Meatus: Size normal, Location normal, Lesions absent, Prolapse absent.        Vagina: no abnormal discharge or lesions.       Cervix: not indicated.       Uterus:  not examined.       Adenexa: no tenderness bilaterally.       Anus/Perineum:  normal..  Integument:  Normal turgor.  Warm, dry, without visible rash, unless noted elsewhere.  Lymphatics: No lymphangitis or adenopathy noted.  Neurological:  Orientation age-appropriate.  Motor functions intact.    Lab / Imaging Results   (All laboratory and radiology results have been personally reviewed by myself)  Labs:  No results found for this visit on 09/05/24.  Imaging:  All Radiology results interpreted by Radiologist unless otherwise noted.  No orders to display     ED Course / Medical Decision Making   Medications - No data to display       Consults:   None    Procedures:   none    MDM:   Please refer to HPI as above, on exam patient has an obvious candidal rash to the vulva and upper inner thighs, no abnormal vaginal discharge, no adnexal tenderness.  Discussed with patient she will need to allow as much air as possible to the area, advised that she should avoid wearing underpants if possible.  Will be placed on Diflucan 50 mg orally daily for 7 days.  Advised use of  nystatin powder.  Advised to follow-up with her OB/GYN.  Discussed conditions require emergent reevaluation ED.    Plan of Care/Counseling:  HYUN Sandhu CNP reviewed today's visit with the patient in addition to providing specific details for the plan of care and counseling regarding the diagnosis and prognosis.  Questions are answered at this time and are agreeable with the plan.    Assessment      1. Intertrigo of genitocrural region due to Candida species      Plan   Discharged home.  Patient condition is stable    Bernie Bojorquez APRN - CNP  [address] [place] [id number] [phone]    Schedule an appointment as soon as possible for a visit        New Medications     Discharge Medication List as of 9/5/2024  5:12 PM        START taking these medications    Details   fluconazole (DIFLUCAN) 50 MG tablet Take 1 tablet by mouth daily for 7 days, Disp-7 tablet, R-0Normal      nystatin (MYCOSTATIN) 762114 UNIT/GM powder Apply topically 4 times daily., Disp-60 g, R-0, Normal           Electronically signed by HYUN Sandhu CNP   DD: 9/5/24  **This report was transcribed using voice recognition software. Every effort was made to ensure accuracy; however, inadvertent computerized transcription errors may be present.  END OF ED PROVIDER NOTE      Yasmin Kitchen APRN - CNP  09/05/24 [id number]

## 2024-12-31 ENCOUNTER — HOSPITAL ENCOUNTER (OUTPATIENT)
Dept: GENERAL RADIOLOGY | Age: 29
Discharge: HOME | End: 2025-01-02
Payer: COMMERCIAL

## 2024-12-31 VITALS — WEIGHT: 214 LBS | HEIGHT: 61 IN | BODY MASS INDEX: 40.4 KG/M2

## 2024-12-31 DIAGNOSIS — N64.4 MASTODYNIA: ICD-10-CM

## 2024-12-31 PROCEDURE — G0279 TOMOSYNTHESIS, MAMMO: HCPCS

## 2024-12-31 PROCEDURE — 76642 ULTRASOUND BREAST LIMITED: CPT

## 2025-06-27 ENCOUNTER — OFFICE VISIT (OUTPATIENT)
Dept: ENT CLINIC | Age: 30
End: 2025-06-27

## 2025-06-27 ENCOUNTER — PROCEDURE VISIT (OUTPATIENT)
Dept: AUDIOLOGY | Age: 30
End: 2025-06-27
Payer: COMMERCIAL

## 2025-06-27 VITALS
DIASTOLIC BLOOD PRESSURE: 73 MMHG | WEIGHT: 210.1 LBS | SYSTOLIC BLOOD PRESSURE: 102 MMHG | BODY MASS INDEX: 39.67 KG/M2 | HEART RATE: 53 BPM | TEMPERATURE: 97.2 F | OXYGEN SATURATION: 100 % | HEIGHT: 61 IN

## 2025-06-27 DIAGNOSIS — H92.03 OTALGIA OF BOTH EARS: Primary | ICD-10-CM

## 2025-06-27 DIAGNOSIS — H92.02 OTALGIA OF LEFT EAR: Primary | ICD-10-CM

## 2025-06-27 DIAGNOSIS — H69.93 EUSTACHIAN TUBE DYSFUNCTION, BILATERAL: ICD-10-CM

## 2025-06-27 DIAGNOSIS — H61.22 IMPACTED CERUMEN OF LEFT EAR: ICD-10-CM

## 2025-06-27 PROCEDURE — 92567 TYMPANOMETRY: CPT | Performed by: AUDIOLOGIST

## 2025-06-27 RX ORDER — FLUTICASONE PROPIONATE 50 MCG
2 SPRAY, SUSPENSION (ML) NASAL DAILY
Qty: 16 G | Refills: 5 | Status: SHIPPED | OUTPATIENT
Start: 2025-06-27

## 2025-06-27 ASSESSMENT — ENCOUNTER SYMPTOMS
RESPIRATORY NEGATIVE: 1
SORE THROAT: 0
RHINORRHEA: 0
ALLERGIC/IMMUNOLOGIC NEGATIVE: 1

## 2025-06-27 NOTE — PROGRESS NOTES
Pulmonary:      Effort: Pulmonary effort is normal. No respiratory distress.      Breath sounds: Normal breath sounds.   Musculoskeletal:      Cervical back: Normal range of motion and neck supple.   Lymphadenopathy:      Cervical: No cervical adenopathy.   Skin:     General: Skin is warm and dry.      Capillary Refill: Capillary refill takes less than 2 seconds.   Neurological:      Mental Status: She is alert and oriented to person, place, and time.     Cerumen Impaction Removal Procedure     Auditory canal(s) left ear completely obstructed with cerumen.  A microscope was used due to deep impaction of the cerumen. Cerumen was gently removed using suction. Tympanic membranes are intact following the procedure. Auditory canals appear normal.          Results              Assessment:       Diagnosis Orders   1. Otalgia of left ear        2. Impacted cerumen of left ear  REMOVE IMPACTED EAR WAX      3. Eustachian tube dysfunction, bilateral                Plan:        Assessment & Plan  1. Eustachian tube dysfunction, cerumen impaction   Symptoms include left ear pounding pressure, associated hearing loss, tinnitus, dizziness, and a sensation of clogged ears. Possible menieres disease picture but feeling okay today. Examination revealed a significant amount of wax and debris in the ear, which may be contributing to the symptoms. Earwax removal will be performed today to alleviate symptoms and further evaluate the condition of the ear.      Flonase 2 sprays daily for possible ETD, low salt diet   Keep ears clean    Follow up in 6weeks or sooner for ear infection           This note may be dictated with vocal recognition software. All grammatical or semantic errors should be considered accordingly.      Contains text generated by [employer]    Electronically signed by Luann Villa DO on 6/27/25 at8:28 AM EDT

## 2025-07-01 NOTE — PROGRESS NOTES
This patient was referred for tympanometric testing by Dr. Villa due to ear pain, bilaterally.    Tympanometry revealed normal middle ear peak pressure and compliance, bilaterally.  Ipsilateral acoustic reflexes were present, bilaterally at 1000Hz.    The results were reviewed with the patient and ordering provider.     Recommendations for follow up will be made pending ordering provider consult.    Wilner Foster CCC/ROJAS  Audiologist  [id number]  NPI#:  [id number]      Electronically signed by Geronimo Toscano on 7/1/2025 at 12:38 PM

## 2025-09-02 ENCOUNTER — CLINICAL DOCUMENTATION (OUTPATIENT)
Dept: GENERAL RADIOLOGY | Age: 30
End: 2025-09-02

## (undated) DEVICE — NDL CNTR 40CT FM MAG: Brand: MEDLINE INDUSTRIES, INC.

## (undated) DEVICE — DRAPE 64X41IN RADIOLOGY C ARM EQUIP STER

## (undated) DEVICE — DRESSING GZ W1XL8IN COT XRFRM N ADH OVERWRAP CURAD

## (undated) DEVICE — BANDAGE COMPR W4INXL5YD WHT BGE POLY COT M E WRP WV HK AND

## (undated) DEVICE — HAND SET

## (undated) DEVICE — READY WET SKIN SCRUB TRAY-LF: Brand: MEDLINE INDUSTRIES, INC.

## (undated) DEVICE — DOUBLE BASIN SET: Brand: MEDLINE INDUSTRIES, INC.

## (undated) DEVICE — PACK EXT II SIRUS

## (undated) DEVICE — TUBING, SUCTION, 1/4" X 10', STRAIGHT: Brand: MEDLINE

## (undated) DEVICE — GOWN,SIRUS,FABRNF,XL,20/CS: Brand: MEDLINE

## (undated) DEVICE — PENCIL ES L3M BTTN SWCH HOLSTER W/ BLDE ELECTRD EDGE

## (undated) DEVICE — MEDI-VAC YANKAUER SUCTION HANDLE: Brand: CARDINAL HEALTH

## (undated) DEVICE — MARKER,SKIN,WI/RULER AND LABELS: Brand: MEDLINE

## (undated) DEVICE — Z DISCONTINUED NO SUB IDED GLOVE SURG BEAD CUF 8 STD PF WHT STRL TRIUMPH LT LTX

## (undated) DEVICE — SPONGE,LAP,12"X12",XR,ST,5/PK,40PK/CS: Brand: MEDLINE

## (undated) DEVICE — GOWN,SIRUS,POLYRNF,BRTHSLV,XLN/XL,20/CS: Brand: MEDLINE

## (undated) DEVICE — SOLUTION IV IRRIG 250ML ST LF 0.9% SODIUM 2F7122

## (undated) DEVICE — SYRINGE IRRIG 60ML SFT PLIABLE BLB EZ TO GRP 1 HND USE W/

## (undated) DEVICE — GAUZE,SPONGE,4"X4",16PLY,XRAY,STRL,LF: Brand: MEDLINE

## (undated) DEVICE — PATIENT RETURN ELECTRODE, SINGLE-USE, CONTACT QUALITY MONITORING, ADULT, WITH 9FT CORD, FOR PATIENTS WEIGING OVER 33LBS. (15KG): Brand: MEGADYNE

## (undated) DEVICE — INTENDED FOR TISSUE SEPARATION, AND OTHER PROCEDURES THAT REQUIRE A SHARP SURGICAL BLADE TO PUNCTURE OR CUT.: Brand: BARD-PARKER ® STAINLESS STEEL BLADES

## (undated) DEVICE — GAUZE,SPONGE,4"X4",16PLY,STRL,LF,10/TRAY: Brand: MEDLINE

## (undated) DEVICE — TOWEL,OR,DSP,ST,BLUE,STD,6/PK,12PK/CS: Brand: MEDLINE

## (undated) DEVICE — Z DISCONTINUED USE 2275686 GLOVE SURG SZ 8 L12IN FNGR THK13MIL WHT ISOLEX POLYISOPRENE

## (undated) DEVICE — COVER,LIGHT HANDLE,FLX,1/PK: Brand: MEDLINE INDUSTRIES, INC.

## (undated) DEVICE — PADDING,UNDERCAST,COTTON, 4"X4YD STERILE: Brand: MEDLINE

## (undated) DEVICE — BIT DRL L110MM DIA1.8MM QUIK CPL CALIB W/O STP REUSE

## (undated) DEVICE — BANDAGE,ELASTIC,ESMARK,STERILE,4"X9',LF: Brand: MEDLINE

## (undated) DEVICE — SHEET,DRAPE,40X58,STERILE: Brand: MEDLINE

## (undated) DEVICE — SCREW BNE L22MM DIA2.4MM DST RAD VOLAR S STL ST VAR ANG LOK
Type: IMPLANTABLE DEVICE | Site: WRIST | Status: NON-FUNCTIONAL
Removed: 2019-05-31

## (undated) DEVICE — BIT DRL L100MM DIA2MM ST QUIK CPL NONRADIOPAQUE W/O STP